# Patient Record
Sex: MALE | Race: WHITE | NOT HISPANIC OR LATINO | Employment: OTHER | URBAN - METROPOLITAN AREA
[De-identification: names, ages, dates, MRNs, and addresses within clinical notes are randomized per-mention and may not be internally consistent; named-entity substitution may affect disease eponyms.]

---

## 2019-10-17 ENCOUNTER — APPOINTMENT (OUTPATIENT)
Dept: RADIOLOGY | Facility: MEDICAL CENTER | Age: 69
DRG: 064 | End: 2019-10-17
Attending: EMERGENCY MEDICINE
Payer: MEDICARE

## 2019-10-17 ENCOUNTER — HOSPITAL ENCOUNTER (INPATIENT)
Facility: MEDICAL CENTER | Age: 69
LOS: 5 days | DRG: 064 | End: 2019-10-25
Attending: EMERGENCY MEDICINE | Admitting: FAMILY MEDICINE
Payer: MEDICARE

## 2019-10-17 DIAGNOSIS — R11.2 NON-INTRACTABLE VOMITING WITH NAUSEA, UNSPECIFIED VOMITING TYPE: ICD-10-CM

## 2019-10-17 DIAGNOSIS — I71.20 THORACIC AORTIC ANEURYSM WITHOUT RUPTURE (HCC): ICD-10-CM

## 2019-10-17 DIAGNOSIS — G44.53 THUNDERCLAP HEADACHE: ICD-10-CM

## 2019-10-17 PROBLEM — I60.9 SUBARACHNOID HEMORRHAGE (HCC): Status: ACTIVE | Noted: 2019-10-17

## 2019-10-17 PROBLEM — I60.9 SUBARACHNOID BLEED (HCC): Status: ACTIVE | Noted: 2019-10-17

## 2019-10-17 PROBLEM — I10 ESSENTIAL HYPERTENSION: Status: ACTIVE | Noted: 2019-10-17

## 2019-10-17 PROBLEM — I60.9 SUBARACHNOID HEMORRHAGE (HCC): Status: RESOLVED | Noted: 2019-10-17 | Resolved: 2019-10-17

## 2019-10-17 PROBLEM — H40.9 GLAUCOMA: Status: ACTIVE | Noted: 2019-10-17

## 2019-10-17 PROCEDURE — 96375 TX/PRO/DX INJ NEW DRUG ADDON: CPT

## 2019-10-17 PROCEDURE — 80048 BASIC METABOLIC PNL TOTAL CA: CPT

## 2019-10-17 PROCEDURE — 700117 HCHG RX CONTRAST REV CODE 255: Performed by: EMERGENCY MEDICINE

## 2019-10-17 PROCEDURE — 700111 HCHG RX REV CODE 636 W/ 250 OVERRIDE (IP)

## 2019-10-17 PROCEDURE — 62270 DX LMBR SPI PNXR: CPT

## 2019-10-17 PROCEDURE — 70496 CT ANGIOGRAPHY HEAD: CPT

## 2019-10-17 PROCEDURE — 009U3ZX DRAINAGE OF SPINAL CANAL, PERCUTANEOUS APPROACH, DIAGNOSTIC: ICD-10-PCS | Performed by: EMERGENCY MEDICINE

## 2019-10-17 PROCEDURE — 96374 THER/PROPH/DIAG INJ IV PUSH: CPT

## 2019-10-17 PROCEDURE — 99220 PR INITIAL OBSERVATION CARE,LEVL III: CPT | Performed by: HOSPITALIST

## 2019-10-17 PROCEDURE — 304561 HCHG STAT O2

## 2019-10-17 PROCEDURE — G0378 HOSPITAL OBSERVATION PER HR: HCPCS

## 2019-10-17 PROCEDURE — 70498 CT ANGIOGRAPHY NECK: CPT

## 2019-10-17 PROCEDURE — 85025 COMPLETE CBC W/AUTO DIFF WBC: CPT

## 2019-10-17 PROCEDURE — 99285 EMERGENCY DEPT VISIT HI MDM: CPT

## 2019-10-17 RX ORDER — ACETAMINOPHEN 325 MG/1
650 TABLET ORAL EVERY 6 HOURS PRN
Status: DISCONTINUED | OUTPATIENT
Start: 2019-10-17 | End: 2019-10-20

## 2019-10-17 RX ORDER — ONDANSETRON 4 MG/1
4 TABLET, ORALLY DISINTEGRATING ORAL EVERY 4 HOURS PRN
Status: DISCONTINUED | OUTPATIENT
Start: 2019-10-17 | End: 2019-10-25 | Stop reason: HOSPADM

## 2019-10-17 RX ORDER — AMLODIPINE BESYLATE 10 MG/1
10 TABLET ORAL DAILY
COMMUNITY

## 2019-10-17 RX ORDER — TIMOLOL MALEATE 5 MG/ML
1 SOLUTION/ DROPS OPHTHALMIC EVERY MORNING
COMMUNITY

## 2019-10-17 RX ORDER — LATANOPROST 50 UG/ML
2 SOLUTION/ DROPS OPHTHALMIC NIGHTLY
Status: DISCONTINUED | OUTPATIENT
Start: 2019-10-18 | End: 2019-10-25 | Stop reason: HOSPADM

## 2019-10-17 RX ORDER — AMLODIPINE BESYLATE 10 MG/1
10 TABLET ORAL DAILY
Status: DISCONTINUED | OUTPATIENT
Start: 2019-10-18 | End: 2019-10-20

## 2019-10-17 RX ORDER — OXYCODONE HYDROCHLORIDE 5 MG/1
2.5 TABLET ORAL
Status: DISCONTINUED | OUTPATIENT
Start: 2019-10-17 | End: 2019-10-20

## 2019-10-17 RX ORDER — MORPHINE SULFATE 4 MG/ML
2 INJECTION, SOLUTION INTRAMUSCULAR; INTRAVENOUS
Status: DISCONTINUED | OUTPATIENT
Start: 2019-10-17 | End: 2019-10-20

## 2019-10-17 RX ORDER — ONDANSETRON 2 MG/ML
4 INJECTION INTRAMUSCULAR; INTRAVENOUS ONCE
Status: COMPLETED | OUTPATIENT
Start: 2019-10-17 | End: 2019-10-17

## 2019-10-17 RX ORDER — LATANOPROST 50 UG/ML
2 SOLUTION/ DROPS OPHTHALMIC NIGHTLY
COMMUNITY

## 2019-10-17 RX ORDER — ONDANSETRON 2 MG/ML
4 INJECTION INTRAMUSCULAR; INTRAVENOUS EVERY 4 HOURS PRN
Status: DISCONTINUED | OUTPATIENT
Start: 2019-10-17 | End: 2019-10-25 | Stop reason: HOSPADM

## 2019-10-17 RX ORDER — AMOXICILLIN 250 MG
2 CAPSULE ORAL 2 TIMES DAILY
Status: DISCONTINUED | OUTPATIENT
Start: 2019-10-18 | End: 2019-10-25 | Stop reason: HOSPADM

## 2019-10-17 RX ORDER — SODIUM CHLORIDE 9 MG/ML
INJECTION, SOLUTION INTRAVENOUS CONTINUOUS
Status: DISCONTINUED | OUTPATIENT
Start: 2019-10-18 | End: 2019-10-20

## 2019-10-17 RX ORDER — BISACODYL 10 MG
10 SUPPOSITORY, RECTAL RECTAL
Status: DISCONTINUED | OUTPATIENT
Start: 2019-10-17 | End: 2019-10-25 | Stop reason: HOSPADM

## 2019-10-17 RX ORDER — OXYCODONE HYDROCHLORIDE 5 MG/1
5 TABLET ORAL
Status: DISCONTINUED | OUTPATIENT
Start: 2019-10-17 | End: 2019-10-20

## 2019-10-17 RX ORDER — TIMOLOL MALEATE 5 MG/ML
1 SOLUTION/ DROPS OPHTHALMIC EVERY MORNING
Status: DISCONTINUED | OUTPATIENT
Start: 2019-10-18 | End: 2019-10-25 | Stop reason: HOSPADM

## 2019-10-17 RX ORDER — POLYETHYLENE GLYCOL 3350 17 G/17G
1 POWDER, FOR SOLUTION ORAL
Status: DISCONTINUED | OUTPATIENT
Start: 2019-10-17 | End: 2019-10-25 | Stop reason: HOSPADM

## 2019-10-17 RX ADMIN — ONDANSETRON 4 MG: 2 INJECTION INTRAMUSCULAR; INTRAVENOUS at 20:58

## 2019-10-17 RX ADMIN — FENTANYL CITRATE 100 MCG: 50 INJECTION, SOLUTION INTRAMUSCULAR; INTRAVENOUS at 20:58

## 2019-10-17 RX ADMIN — IOHEXOL 80 ML: 350 INJECTION, SOLUTION INTRAVENOUS at 20:56

## 2019-10-17 SDOH — HEALTH STABILITY: MENTAL HEALTH: HOW OFTEN DO YOU HAVE A DRINK CONTAINING ALCOHOL?: 2-3 TIMES A WEEK

## 2019-10-17 ASSESSMENT — ENCOUNTER SYMPTOMS
CONSTITUTIONAL NEGATIVE: 1
CARDIOVASCULAR NEGATIVE: 1
BLURRED VISION: 1
MUSCULOSKELETAL NEGATIVE: 1
RESPIRATORY NEGATIVE: 1
GASTROINTESTINAL NEGATIVE: 1
HEADACHES: 1
PSYCHIATRIC NEGATIVE: 1

## 2019-10-18 ENCOUNTER — APPOINTMENT (OUTPATIENT)
Dept: RADIOLOGY | Facility: MEDICAL CENTER | Age: 69
DRG: 064 | End: 2019-10-18
Attending: HOSPITALIST
Payer: MEDICARE

## 2019-10-18 ENCOUNTER — APPOINTMENT (OUTPATIENT)
Dept: RADIOLOGY | Facility: MEDICAL CENTER | Age: 69
DRG: 064 | End: 2019-10-18
Attending: FAMILY MEDICINE
Payer: MEDICARE

## 2019-10-18 PROBLEM — R51.9 HEADACHE: Status: ACTIVE | Noted: 2019-10-18

## 2019-10-18 PROBLEM — M54.2 NECK PAIN: Status: ACTIVE | Noted: 2019-10-18

## 2019-10-18 LAB
ANION GAP SERPL CALC-SCNC: 13 MMOL/L (ref 0–11.9)
ANION GAP SERPL CALC-SCNC: 17 MMOL/L (ref 0–11.9)
APPEARANCE UR: CLEAR
BACTERIA #/AREA URNS HPF: NEGATIVE /HPF
BASOPHILS # BLD AUTO: 0.1 % (ref 0–1.8)
BASOPHILS # BLD AUTO: 0.3 % (ref 0–1.8)
BASOPHILS # BLD: 0.01 K/UL (ref 0–0.12)
BASOPHILS # BLD: 0.03 K/UL (ref 0–0.12)
BILIRUB UR QL STRIP.AUTO: NEGATIVE
BUN SERPL-MCNC: 18 MG/DL (ref 8–22)
BUN SERPL-MCNC: 21 MG/DL (ref 8–22)
BURR CELLS/RBC NFR CSF MANUAL: 0 %
CALCIUM SERPL-MCNC: 9.1 MG/DL (ref 8.5–10.5)
CALCIUM SERPL-MCNC: 9.3 MG/DL (ref 8.5–10.5)
CHLORIDE SERPL-SCNC: 104 MMOL/L (ref 96–112)
CHLORIDE SERPL-SCNC: 105 MMOL/L (ref 96–112)
CLARITY CSF: CLEAR
CO2 SERPL-SCNC: 21 MMOL/L (ref 20–33)
CO2 SERPL-SCNC: 26 MMOL/L (ref 20–33)
COLOR CSF: COLORLESS
COLOR SPUN CSF: COLORLESS
COLOR UR: YELLOW
CREAT SERPL-MCNC: 0.61 MG/DL (ref 0.5–1.4)
CREAT SERPL-MCNC: 0.67 MG/DL (ref 0.5–1.4)
CSF COMMENTS 1658: NORMAL
EOSINOPHIL # BLD AUTO: 0 K/UL (ref 0–0.51)
EOSINOPHIL # BLD AUTO: 0 K/UL (ref 0–0.51)
EOSINOPHIL NFR BLD: 0 % (ref 0–6.9)
EOSINOPHIL NFR BLD: 0 % (ref 0–6.9)
EPI CELLS #/AREA URNS HPF: NEGATIVE /HPF
ERYTHROCYTE [DISTWIDTH] IN BLOOD BY AUTOMATED COUNT: 42.7 FL (ref 35.9–50)
ERYTHROCYTE [DISTWIDTH] IN BLOOD BY AUTOMATED COUNT: 42.7 FL (ref 35.9–50)
ERYTHROCYTE [SEDIMENTATION RATE] IN BLOOD BY WESTERGREN METHOD: 2 MM/HOUR (ref 0–20)
GLUCOSE CSF-MCNC: 79 MG/DL (ref 40–80)
GLUCOSE SERPL-MCNC: 111 MG/DL (ref 65–99)
GLUCOSE SERPL-MCNC: 120 MG/DL (ref 65–99)
GLUCOSE UR STRIP.AUTO-MCNC: NEGATIVE MG/DL
GRAM STN SPEC: NORMAL
HCT VFR BLD AUTO: 49.7 % (ref 42–52)
HCT VFR BLD AUTO: 51.5 % (ref 42–52)
HGB BLD-MCNC: 17.4 G/DL (ref 14–18)
HGB BLD-MCNC: 17.9 G/DL (ref 14–18)
HYALINE CASTS #/AREA URNS LPF: ABNORMAL /LPF
IMM GRANULOCYTES # BLD AUTO: 0.01 K/UL (ref 0–0.11)
IMM GRANULOCYTES # BLD AUTO: 0.04 K/UL (ref 0–0.11)
IMM GRANULOCYTES NFR BLD AUTO: 0.1 % (ref 0–0.9)
IMM GRANULOCYTES NFR BLD AUTO: 0.4 % (ref 0–0.9)
INR PPP: 1.09 (ref 0.87–1.13)
KETONES UR STRIP.AUTO-MCNC: 15 MG/DL
LEUKOCYTE ESTERASE UR QL STRIP.AUTO: NEGATIVE
LYMPHOCYTES # BLD AUTO: 0.64 K/UL (ref 1–4.8)
LYMPHOCYTES # BLD AUTO: 1.03 K/UL (ref 1–4.8)
LYMPHOCYTES NFR BLD: 9.4 % (ref 22–41)
LYMPHOCYTES NFR BLD: 9.8 % (ref 22–41)
LYMPHOCYTES NFR CSF: 66 %
MCH RBC QN AUTO: 31.6 PG (ref 27–33)
MCH RBC QN AUTO: 32 PG (ref 27–33)
MCHC RBC AUTO-ENTMCNC: 34.8 G/DL (ref 33.7–35.3)
MCHC RBC AUTO-ENTMCNC: 35 G/DL (ref 33.7–35.3)
MCV RBC AUTO: 91 FL (ref 81.4–97.8)
MCV RBC AUTO: 91.5 FL (ref 81.4–97.8)
MICRO URNS: ABNORMAL
MONOCYTES # BLD AUTO: 0.35 K/UL (ref 0–0.85)
MONOCYTES # BLD AUTO: 0.35 K/UL (ref 0–0.85)
MONOCYTES NFR BLD AUTO: 3.3 % (ref 0–13.4)
MONOCYTES NFR BLD AUTO: 5.1 % (ref 0–13.4)
MONONUC CELLS NFR CSF: 28 %
NEUTROPHILS # BLD AUTO: 5.83 K/UL (ref 1.82–7.42)
NEUTROPHILS # BLD AUTO: 9.07 K/UL (ref 1.82–7.42)
NEUTROPHILS NFR BLD: 85.3 % (ref 44–72)
NEUTROPHILS NFR BLD: 86.2 % (ref 44–72)
NEUTROPHILS NFR CSF: 6 %
NITRITE UR QL STRIP.AUTO: NEGATIVE
NRBC # BLD AUTO: 0 K/UL
NRBC # BLD AUTO: 0 K/UL
NRBC BLD-RTO: 0 /100 WBC
NRBC BLD-RTO: 0 /100 WBC
PH UR STRIP.AUTO: 6.5 [PH] (ref 5–8)
PLATELET # BLD AUTO: 220 K/UL (ref 164–446)
PLATELET # BLD AUTO: 222 K/UL (ref 164–446)
PMV BLD AUTO: 10.8 FL (ref 9–12.9)
PMV BLD AUTO: 10.9 FL (ref 9–12.9)
POTASSIUM SERPL-SCNC: 4.3 MMOL/L (ref 3.6–5.5)
POTASSIUM SERPL-SCNC: 4.7 MMOL/L (ref 3.6–5.5)
PROT CSF-MCNC: 49 MG/DL (ref 15–45)
PROT UR QL STRIP: NEGATIVE MG/DL
PROTHROMBIN TIME: 14.3 SEC (ref 12–14.6)
RBC # BLD AUTO: 5.43 M/UL (ref 4.7–6.1)
RBC # BLD AUTO: 5.66 M/UL (ref 4.7–6.1)
RBC # CSF: 24 CELLS/UL
RBC # URNS HPF: ABNORMAL /HPF
RBC UR QL AUTO: ABNORMAL
SIGNIFICANT IND 70042: NORMAL
SITE SITE: NORMAL
SODIUM SERPL-SCNC: 143 MMOL/L (ref 135–145)
SODIUM SERPL-SCNC: 143 MMOL/L (ref 135–145)
SOURCE SOURCE: NORMAL
SP GR UR STRIP.AUTO: 1.01
SPECIMEN VOL CSF: 2.5 ML
TSH SERPL DL<=0.005 MIU/L-ACNC: 0.39 UIU/ML (ref 0.38–5.33)
TUBE # CSF: 3
TUBE # CSF: 3
UROBILINOGEN UR STRIP.AUTO-MCNC: 1 MG/DL
WBC # BLD AUTO: 10.5 K/UL (ref 4.8–10.8)
WBC # BLD AUTO: 6.8 K/UL (ref 4.8–10.8)
WBC # CSF: 1 CELLS/UL (ref 0–10)
WBC #/AREA URNS HPF: ABNORMAL /HPF

## 2019-10-18 PROCEDURE — 87070 CULTURE OTHR SPECIMN AEROBIC: CPT

## 2019-10-18 PROCEDURE — 82945 GLUCOSE OTHER FLUID: CPT

## 2019-10-18 PROCEDURE — 700102 HCHG RX REV CODE 250 W/ 637 OVERRIDE(OP): Performed by: HOSPITALIST

## 2019-10-18 PROCEDURE — 700101 HCHG RX REV CODE 250: Performed by: FAMILY MEDICINE

## 2019-10-18 PROCEDURE — 96376 TX/PRO/DX INJ SAME DRUG ADON: CPT

## 2019-10-18 PROCEDURE — 700111 HCHG RX REV CODE 636 W/ 250 OVERRIDE (IP): Performed by: HOSPITALIST

## 2019-10-18 PROCEDURE — 72141 MRI NECK SPINE W/O DYE: CPT

## 2019-10-18 PROCEDURE — 85652 RBC SED RATE AUTOMATED: CPT

## 2019-10-18 PROCEDURE — 80048 BASIC METABOLIC PNL TOTAL CA: CPT

## 2019-10-18 PROCEDURE — 85025 COMPLETE CBC W/AUTO DIFF WBC: CPT

## 2019-10-18 PROCEDURE — 87040 BLOOD CULTURE FOR BACTERIA: CPT | Mod: 91

## 2019-10-18 PROCEDURE — 99226 PR SUBSEQUENT OBSERVATION CARE,LEVEL III: CPT | Performed by: FAMILY MEDICINE

## 2019-10-18 PROCEDURE — 62270 DX LMBR SPI PNXR: CPT

## 2019-10-18 PROCEDURE — 36415 COLL VENOUS BLD VENIPUNCTURE: CPT

## 2019-10-18 PROCEDURE — G0378 HOSPITAL OBSERVATION PER HR: HCPCS

## 2019-10-18 PROCEDURE — 87205 SMEAR GRAM STAIN: CPT

## 2019-10-18 PROCEDURE — 85610 PROTHROMBIN TIME: CPT

## 2019-10-18 PROCEDURE — 700105 HCHG RX REV CODE 258: Performed by: HOSPITALIST

## 2019-10-18 PROCEDURE — 84443 ASSAY THYROID STIM HORMONE: CPT

## 2019-10-18 PROCEDURE — 84157 ASSAY OF PROTEIN OTHER: CPT

## 2019-10-18 PROCEDURE — A9270 NON-COVERED ITEM OR SERVICE: HCPCS | Performed by: HOSPITALIST

## 2019-10-18 PROCEDURE — 81001 URINALYSIS AUTO W/SCOPE: CPT

## 2019-10-18 PROCEDURE — 700101 HCHG RX REV CODE 250: Performed by: HOSPITALIST

## 2019-10-18 PROCEDURE — 71046 X-RAY EXAM CHEST 2 VIEWS: CPT

## 2019-10-18 PROCEDURE — 89051 BODY FLUID CELL COUNT: CPT

## 2019-10-18 RX ORDER — LIDOCAINE 50 MG/G
2 PATCH TOPICAL EVERY 24 HOURS
Status: DISCONTINUED | OUTPATIENT
Start: 2019-10-18 | End: 2019-10-23

## 2019-10-18 RX ADMIN — SODIUM CHLORIDE: 9 INJECTION, SOLUTION INTRAVENOUS at 14:05

## 2019-10-18 RX ADMIN — TIMOLOL MALEATE 1 DROP: 5 SOLUTION OPHTHALMIC at 05:55

## 2019-10-18 RX ADMIN — LIDOCAINE 2 PATCH: 50 PATCH TOPICAL at 17:52

## 2019-10-18 RX ADMIN — ONDANSETRON 4 MG: 2 INJECTION INTRAMUSCULAR; INTRAVENOUS at 14:45

## 2019-10-18 RX ADMIN — ACETAMINOPHEN 650 MG: 325 TABLET, FILM COATED ORAL at 04:26

## 2019-10-18 RX ADMIN — AMLODIPINE BESYLATE 10 MG: 10 TABLET ORAL at 05:54

## 2019-10-18 RX ADMIN — SODIUM CHLORIDE: 9 INJECTION, SOLUTION INTRAVENOUS at 02:10

## 2019-10-18 ASSESSMENT — PATIENT HEALTH QUESTIONNAIRE - PHQ9
SUM OF ALL RESPONSES TO PHQ9 QUESTIONS 1 AND 2: 0
1. LITTLE INTEREST OR PLEASURE IN DOING THINGS: NOT AT ALL
1. LITTLE INTEREST OR PLEASURE IN DOING THINGS: NOT AT ALL
2. FEELING DOWN, DEPRESSED, IRRITABLE, OR HOPELESS: NOT AT ALL
1. LITTLE INTEREST OR PLEASURE IN DOING THINGS: NOT AT ALL
SUM OF ALL RESPONSES TO PHQ9 QUESTIONS 1 AND 2: 0
2. FEELING DOWN, DEPRESSED, IRRITABLE, OR HOPELESS: NOT AT ALL
2. FEELING DOWN, DEPRESSED, IRRITABLE, OR HOPELESS: NOT AT ALL
SUM OF ALL RESPONSES TO PHQ9 QUESTIONS 1 AND 2: 0

## 2019-10-18 ASSESSMENT — ENCOUNTER SYMPTOMS
DIARRHEA: 0
CHILLS: 0
SORE THROAT: 0
NAUSEA: 1
SPUTUM PRODUCTION: 0
BLURRED VISION: 0
VOMITING: 0
SHORTNESS OF BREATH: 0
PHOTOPHOBIA: 0
NECK PAIN: 1
COUGH: 0
ABDOMINAL PAIN: 0
SINUS PAIN: 0
BACK PAIN: 0
FEVER: 0
FALLS: 0
BLOOD IN STOOL: 0
PALPITATIONS: 0
HEARTBURN: 0
CONSTIPATION: 0
DIZZINESS: 0
DOUBLE VISION: 0
WHEEZING: 0
WEAKNESS: 1
DIAPHORESIS: 0
MYALGIAS: 0
HEADACHES: 1

## 2019-10-18 ASSESSMENT — LIFESTYLE VARIABLES
HAVE PEOPLE ANNOYED YOU BY CRITICIZING YOUR DRINKING: NO
TOTAL SCORE: 0
HAVE YOU EVER FELT YOU SHOULD CUT DOWN ON YOUR DRINKING: NO
TOTAL SCORE: 0
CONSUMPTION TOTAL: NEGATIVE
AVERAGE NUMBER OF DAYS PER WEEK YOU HAVE A DRINK CONTAINING ALCOHOL: 7
EVER HAD A DRINK FIRST THING IN THE MORNING TO STEADY YOUR NERVES TO GET RID OF A HANGOVER: NO
ON A TYPICAL DAY WHEN YOU DRINK ALCOHOL HOW MANY DRINKS DO YOU HAVE: 2
HOW MANY TIMES IN THE PAST YEAR HAVE YOU HAD 5 OR MORE DRINKS IN A DAY: 0
EVER_SMOKED: NEVER
ALCOHOL_USE: YES
EVER FELT BAD OR GUILTY ABOUT YOUR DRINKING: NO
TOTAL SCORE: 0

## 2019-10-18 NOTE — ED NOTES
Medication reconciliation updated and complete per pt at bedside with rx bottles  Reviewed rx bottles and returned to pt   Allergies have been verified   No oral ABX within the last 14 days  Pt home pharmacy:[facility] in [place]

## 2019-10-18 NOTE — PROGRESS NOTES
Admit from ED via gurney,fatigue alert, pt reports dizziness, refusing to ambulate at this time,transferred to bed safely,hooked to the monitor,bedside swallow completed,call button within reach,will continue to monitor.

## 2019-10-18 NOTE — ASSESSMENT & PLAN NOTE
Concern for the same with headache and visual change, but with normal CTA of head and neck.  LP attempted in ED with serous pink fluid but no other spinal fluid attempted X3 bottles.  Plan for IR LP in AM.  Neurochecks.  Monitor.

## 2019-10-18 NOTE — ED TRIAGE NOTES
"Chief Complaint   Patient presents with   • Headache     Sudden onset of 10/10 posterior head pain. No trauma. Dizziness, nausea. Transfered for CT.     /86   Pulse 79   Temp 36.6 °C (97.8 °F) (Temporal)   Resp 16   Ht 1.803 m (5' 11\")   Wt 77.1 kg (170 lb)   SpO2 94%   BMI 23.71 kg/m²     PT to ER. Hospital transfer for CT.    PT received 100 mcg fentanyl. 8 mg zofran pta. Pain is controlled, pt reports nausea.    PT on 2L NC, does not wear oxygen at home.  "
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
## 2019-10-18 NOTE — ASSESSMENT & PLAN NOTE
Keep systolic blood pressure less than 140 as per neurology  PRN IV Enalapril, hydralazine, and labetalol  Continue norvasc 10 mg daily home medication  He required an IV nicardipine drip thus ICU  Increased lisinopril from 10 mg today to 20 daily
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
## 2019-10-19 ENCOUNTER — APPOINTMENT (OUTPATIENT)
Dept: RADIOLOGY | Facility: MEDICAL CENTER | Age: 69
DRG: 064 | End: 2019-10-19
Attending: FAMILY MEDICINE
Payer: MEDICARE

## 2019-10-19 PROBLEM — R42 DIZZINESS: Status: ACTIVE | Noted: 2019-10-19

## 2019-10-19 LAB
ALBUMIN SERPL BCP-MCNC: 3.7 G/DL (ref 3.2–4.9)
ALBUMIN/GLOB SERPL: 1.9 G/DL
ALP SERPL-CCNC: 43 U/L (ref 30–99)
ALT SERPL-CCNC: 11 U/L (ref 2–50)
ANION GAP SERPL CALC-SCNC: 10 MMOL/L (ref 0–11.9)
AST SERPL-CCNC: 16 U/L (ref 12–45)
BASOPHILS # BLD AUTO: 0.3 % (ref 0–1.8)
BASOPHILS # BLD: 0.03 K/UL (ref 0–0.12)
BILIRUB SERPL-MCNC: 1.2 MG/DL (ref 0.1–1.5)
BUN SERPL-MCNC: 12 MG/DL (ref 8–22)
CALCIUM SERPL-MCNC: 8.5 MG/DL (ref 8.5–10.5)
CHLORIDE SERPL-SCNC: 106 MMOL/L (ref 96–112)
CO2 SERPL-SCNC: 23 MMOL/L (ref 20–33)
CREAT SERPL-MCNC: 0.58 MG/DL (ref 0.5–1.4)
CRP SERPL HS-MCNC: 0.18 MG/DL (ref 0–0.75)
EOSINOPHIL # BLD AUTO: 0.02 K/UL (ref 0–0.51)
EOSINOPHIL NFR BLD: 0.2 % (ref 0–6.9)
ERYTHROCYTE [DISTWIDTH] IN BLOOD BY AUTOMATED COUNT: 42 FL (ref 35.9–50)
ERYTHROCYTE [SEDIMENTATION RATE] IN BLOOD BY WESTERGREN METHOD: 2 MM/HOUR (ref 0–20)
FLUAV RNA SPEC QL NAA+PROBE: NEGATIVE
FLUBV RNA SPEC QL NAA+PROBE: NEGATIVE
GLOBULIN SER CALC-MCNC: 1.9 G/DL (ref 1.9–3.5)
GLUCOSE SERPL-MCNC: 96 MG/DL (ref 65–99)
HCT VFR BLD AUTO: 49.2 % (ref 42–52)
HGB BLD-MCNC: 16.5 G/DL (ref 14–18)
IMM GRANULOCYTES # BLD AUTO: 0.02 K/UL (ref 0–0.11)
IMM GRANULOCYTES NFR BLD AUTO: 0.2 % (ref 0–0.9)
LYMPHOCYTES # BLD AUTO: 1.99 K/UL (ref 1–4.8)
LYMPHOCYTES NFR BLD: 21.8 % (ref 22–41)
MCH RBC QN AUTO: 31 PG (ref 27–33)
MCHC RBC AUTO-ENTMCNC: 33.5 G/DL (ref 33.7–35.3)
MCV RBC AUTO: 92.3 FL (ref 81.4–97.8)
MONOCYTES # BLD AUTO: 1.03 K/UL (ref 0–0.85)
MONOCYTES NFR BLD AUTO: 11.3 % (ref 0–13.4)
NEUTROPHILS # BLD AUTO: 6.04 K/UL (ref 1.82–7.42)
NEUTROPHILS NFR BLD: 66.2 % (ref 44–72)
NRBC # BLD AUTO: 0 K/UL
NRBC BLD-RTO: 0 /100 WBC
PLATELET # BLD AUTO: 203 K/UL (ref 164–446)
PMV BLD AUTO: 10.2 FL (ref 9–12.9)
POTASSIUM SERPL-SCNC: 3.7 MMOL/L (ref 3.6–5.5)
PROT SERPL-MCNC: 5.6 G/DL (ref 6–8.2)
RBC # BLD AUTO: 5.33 M/UL (ref 4.7–6.1)
SODIUM SERPL-SCNC: 139 MMOL/L (ref 135–145)
WBC # BLD AUTO: 9.1 K/UL (ref 4.8–10.8)

## 2019-10-19 PROCEDURE — 700105 HCHG RX REV CODE 258: Performed by: HOSPITALIST

## 2019-10-19 PROCEDURE — 86140 C-REACTIVE PROTEIN: CPT

## 2019-10-19 PROCEDURE — 85652 RBC SED RATE AUTOMATED: CPT

## 2019-10-19 PROCEDURE — 85025 COMPLETE CBC W/AUTO DIFF WBC: CPT

## 2019-10-19 PROCEDURE — 87502 INFLUENZA DNA AMP PROBE: CPT

## 2019-10-19 PROCEDURE — A9270 NON-COVERED ITEM OR SERVICE: HCPCS | Performed by: HOSPITALIST

## 2019-10-19 PROCEDURE — A9576 INJ PROHANCE MULTIPACK: HCPCS | Performed by: FAMILY MEDICINE

## 2019-10-19 PROCEDURE — 70553 MRI BRAIN STEM W/O & W/DYE: CPT

## 2019-10-19 PROCEDURE — 80053 COMPREHEN METABOLIC PANEL: CPT

## 2019-10-19 PROCEDURE — 99226 PR SUBSEQUENT OBSERVATION CARE,LEVEL III: CPT | Performed by: FAMILY MEDICINE

## 2019-10-19 PROCEDURE — 700102 HCHG RX REV CODE 250 W/ 637 OVERRIDE(OP): Performed by: HOSPITALIST

## 2019-10-19 PROCEDURE — 36415 COLL VENOUS BLD VENIPUNCTURE: CPT

## 2019-10-19 PROCEDURE — 700117 HCHG RX CONTRAST REV CODE 255: Performed by: FAMILY MEDICINE

## 2019-10-19 PROCEDURE — G0378 HOSPITAL OBSERVATION PER HR: HCPCS

## 2019-10-19 RX ADMIN — ACETAMINOPHEN 650 MG: 325 TABLET, FILM COATED ORAL at 19:41

## 2019-10-19 RX ADMIN — SODIUM CHLORIDE: 9 INJECTION, SOLUTION INTRAVENOUS at 03:30

## 2019-10-19 RX ADMIN — GADOTERIDOL 15 ML: 279.3 INJECTION, SOLUTION INTRAVENOUS at 15:10

## 2019-10-19 RX ADMIN — OXYCODONE HYDROCHLORIDE 5 MG: 5 TABLET ORAL at 10:04

## 2019-10-19 RX ADMIN — SODIUM CHLORIDE: 9 INJECTION, SOLUTION INTRAVENOUS at 18:00

## 2019-10-19 RX ADMIN — LATANOPROST 2 DROP: 50 SOLUTION OPHTHALMIC at 19:32

## 2019-10-19 RX ADMIN — TIMOLOL MALEATE 1 DROP: 5 SOLUTION OPHTHALMIC at 06:20

## 2019-10-19 RX ADMIN — AMLODIPINE BESYLATE 10 MG: 10 TABLET ORAL at 06:18

## 2019-10-19 ASSESSMENT — ENCOUNTER SYMPTOMS
DIZZINESS: 0
HEARTBURN: 0
BLURRED VISION: 0
FEVER: 0
MYALGIAS: 0
PHOTOPHOBIA: 1
ABDOMINAL PAIN: 0
WHEEZING: 0
HEADACHES: 1
SINUS PAIN: 0
WEAKNESS: 1
SHORTNESS OF BREATH: 0
SORE THROAT: 0
FALLS: 0
NAUSEA: 1
SPUTUM PRODUCTION: 0
BACK PAIN: 0
BLOOD IN STOOL: 0
DIAPHORESIS: 0
VOMITING: 0
PALPITATIONS: 0
DOUBLE VISION: 0
COUGH: 0
CONSTIPATION: 0
DIARRHEA: 0
NECK PAIN: 1
CHILLS: 0

## 2019-10-19 ASSESSMENT — PATIENT HEALTH QUESTIONNAIRE - PHQ9
1. LITTLE INTEREST OR PLEASURE IN DOING THINGS: NOT AT ALL
2. FEELING DOWN, DEPRESSED, IRRITABLE, OR HOPELESS: NOT AT ALL
SUM OF ALL RESPONSES TO PHQ9 QUESTIONS 1 AND 2: 0

## 2019-10-19 ASSESSMENT — PAIN SCALES - WONG BAKER: WONGBAKER_NUMERICALRESPONSE: HURTS JUST A LITTLE BIT

## 2019-10-19 ASSESSMENT — LIFESTYLE VARIABLES: REASON UNABLE TO ASSESS: 0

## 2019-10-19 NOTE — ASSESSMENT & PLAN NOTE
Secondary to cerebral infarct  He has a re-occurrence of symptoms thus stat CT head ordered for possible hemorrhagic transformation
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
## 2019-10-19 NOTE — CONSULTS
ID consult requested by DAHLIA López.  Attempted to see patient however patient down an MRI  Chart reviewed  70 yo man with glaucoma and HTN admitted for headache and neck pain.  CT head and neck unremarkable  S/p LP on 10/18/19. CSF analysis not c/w infection  No need for abx at this time  Full consult to follow  Preliminary recs d/w DAHLIA Grover and CLAUS/Dr. Palma

## 2019-10-19 NOTE — ASSESSMENT & PLAN NOTE
Secondary to CVA, symptomatic treatment  Cerebral spinal fluid was negative for meningitis/encephalitis

## 2019-10-19 NOTE — PROGRESS NOTES
Patient back to unit/room from Xray. Tolerated well. No events. Assisted back to bed safely. Safety and comfort ensured. Promoted rest and relaxation.

## 2019-10-19 NOTE — PROGRESS NOTES
Received  bedside report from Lisa HINSON. Assumed pt care. Received pt in bed awake, AOx4, SR on monitor, HR: 70s, IVF infusing well. Reports mild right sided neck pain, lidocaine patch in place, poor appetite and weakness. Plan of care reviewed and understood by patient. Encouraged to verbalize feelings. Safety measures in place. Comfort measures offered. Reinforced use of call light.

## 2019-10-19 NOTE — PROGRESS NOTES
0320 AM labs drawn and sent to lab. Patient sleeping/resting calmly at the moment, reports mild tolerable head and neck pains. Declines any interventions at this time.

## 2019-10-19 NOTE — PROGRESS NOTES
Patient resting/sleeping calmly in bed, easily awakened, IVF infusing well. Pt not in distress. Patient denies any needs at this time. Reinforced use of all light. Promoted rest and sleep.

## 2019-10-19 NOTE — PROGRESS NOTES
Patient to Radiology for  2 View CXR, transported via gurney. Safety and fall precautions in place.

## 2019-10-20 ENCOUNTER — APPOINTMENT (OUTPATIENT)
Dept: RADIOLOGY | Facility: MEDICAL CENTER | Age: 69
DRG: 064 | End: 2019-10-20
Attending: STUDENT IN AN ORGANIZED HEALTH CARE EDUCATION/TRAINING PROGRAM
Payer: MEDICARE

## 2019-10-20 ENCOUNTER — APPOINTMENT (OUTPATIENT)
Dept: CARDIOLOGY | Facility: MEDICAL CENTER | Age: 69
DRG: 064 | End: 2019-10-20
Attending: FAMILY MEDICINE
Payer: MEDICARE

## 2019-10-20 ENCOUNTER — APPOINTMENT (OUTPATIENT)
Dept: RADIOLOGY | Facility: MEDICAL CENTER | Age: 69
DRG: 064 | End: 2019-10-20
Attending: NEUROLOGICAL SURGERY
Payer: MEDICARE

## 2019-10-20 PROBLEM — I63.9 CVA (CEREBRAL VASCULAR ACCIDENT) (HCC): Status: ACTIVE | Noted: 2019-10-20

## 2019-10-20 LAB
ALBUMIN SERPL BCP-MCNC: 3.6 G/DL (ref 3.2–4.9)
ALBUMIN/GLOB SERPL: 1.8 G/DL
ALP SERPL-CCNC: 40 U/L (ref 30–99)
ALT SERPL-CCNC: 9 U/L (ref 2–50)
ANION GAP SERPL CALC-SCNC: 8 MMOL/L (ref 0–11.9)
ANION GAP SERPL CALC-SCNC: 9 MMOL/L (ref 0–11.9)
AST SERPL-CCNC: 13 U/L (ref 12–45)
BASOPHILS # BLD AUTO: 0.3 % (ref 0–1.8)
BASOPHILS # BLD: 0.02 K/UL (ref 0–0.12)
BILIRUB SERPL-MCNC: 1.4 MG/DL (ref 0.1–1.5)
BUN SERPL-MCNC: 12 MG/DL (ref 8–22)
BUN SERPL-MCNC: 13 MG/DL (ref 8–22)
BUN SERPL-MCNC: 14 MG/DL (ref 8–22)
BUN SERPL-MCNC: 17 MG/DL (ref 8–22)
CALCIUM SERPL-MCNC: 8.6 MG/DL (ref 8.5–10.5)
CALCIUM SERPL-MCNC: 8.6 MG/DL (ref 8.5–10.5)
CALCIUM SERPL-MCNC: 8.8 MG/DL (ref 8.5–10.5)
CALCIUM SERPL-MCNC: 9 MG/DL (ref 8.5–10.5)
CHLORIDE SERPL-SCNC: 103 MMOL/L (ref 96–112)
CHLORIDE SERPL-SCNC: 104 MMOL/L (ref 96–112)
CHLORIDE SERPL-SCNC: 105 MMOL/L (ref 96–112)
CHLORIDE SERPL-SCNC: 107 MMOL/L (ref 96–112)
CO2 SERPL-SCNC: 23 MMOL/L (ref 20–33)
CO2 SERPL-SCNC: 24 MMOL/L (ref 20–33)
CO2 SERPL-SCNC: 24 MMOL/L (ref 20–33)
CO2 SERPL-SCNC: 25 MMOL/L (ref 20–33)
CREAT SERPL-MCNC: 0.55 MG/DL (ref 0.5–1.4)
CREAT SERPL-MCNC: 0.56 MG/DL (ref 0.5–1.4)
CREAT SERPL-MCNC: 0.66 MG/DL (ref 0.5–1.4)
CREAT SERPL-MCNC: 0.66 MG/DL (ref 0.5–1.4)
EOSINOPHIL # BLD AUTO: 0.05 K/UL (ref 0–0.51)
EOSINOPHIL NFR BLD: 0.7 % (ref 0–6.9)
ERYTHROCYTE [DISTWIDTH] IN BLOOD BY AUTOMATED COUNT: 39.3 FL (ref 35.9–50)
EST. AVERAGE GLUCOSE BLD GHB EST-MCNC: 103 MG/DL
GLOBULIN SER CALC-MCNC: 2 G/DL (ref 1.9–3.5)
GLUCOSE SERPL-MCNC: 173 MG/DL (ref 65–99)
GLUCOSE SERPL-MCNC: 91 MG/DL (ref 65–99)
GLUCOSE SERPL-MCNC: 95 MG/DL (ref 65–99)
GLUCOSE SERPL-MCNC: 99 MG/DL (ref 65–99)
HBA1C MFR BLD: 5.2 % (ref 0–5.6)
HCT VFR BLD AUTO: 48.3 % (ref 42–52)
HGB BLD-MCNC: 17.1 G/DL (ref 14–18)
IMM GRANULOCYTES # BLD AUTO: 0.01 K/UL (ref 0–0.11)
IMM GRANULOCYTES NFR BLD AUTO: 0.1 % (ref 0–0.9)
LV EJECT FRACT MOD 2C 99903: 71.39
LV EJECT FRACT MOD 4C 99902: 61.51
LV EJECT FRACT MOD BP 99901: 66.36
LYMPHOCYTES # BLD AUTO: 1.69 K/UL (ref 1–4.8)
LYMPHOCYTES NFR BLD: 25 % (ref 22–41)
MCH RBC QN AUTO: 31.9 PG (ref 27–33)
MCHC RBC AUTO-ENTMCNC: 35.4 G/DL (ref 33.7–35.3)
MCV RBC AUTO: 90.1 FL (ref 81.4–97.8)
MONOCYTES # BLD AUTO: 0.85 K/UL (ref 0–0.85)
MONOCYTES NFR BLD AUTO: 12.6 % (ref 0–13.4)
NEUTROPHILS # BLD AUTO: 4.15 K/UL (ref 1.82–7.42)
NEUTROPHILS NFR BLD: 61.3 % (ref 44–72)
NRBC # BLD AUTO: 0 K/UL
NRBC BLD-RTO: 0 /100 WBC
PLATELET # BLD AUTO: 182 K/UL (ref 164–446)
PMV BLD AUTO: 9.8 FL (ref 9–12.9)
POTASSIUM SERPL-SCNC: 3.7 MMOL/L (ref 3.6–5.5)
PROT SERPL-MCNC: 5.6 G/DL (ref 6–8.2)
RBC # BLD AUTO: 5.36 M/UL (ref 4.7–6.1)
SODIUM SERPL-SCNC: 136 MMOL/L (ref 135–145)
SODIUM SERPL-SCNC: 137 MMOL/L (ref 135–145)
SODIUM SERPL-SCNC: 138 MMOL/L (ref 135–145)
SODIUM SERPL-SCNC: 139 MMOL/L (ref 135–145)
WBC # BLD AUTO: 6.8 K/UL (ref 4.8–10.8)

## 2019-10-20 PROCEDURE — 36556 INSERT NON-TUNNEL CV CATH: CPT

## 2019-10-20 PROCEDURE — 36556 INSERT NON-TUNNEL CV CATH: CPT | Mod: RT | Performed by: INTERNAL MEDICINE

## 2019-10-20 PROCEDURE — 85025 COMPLETE CBC W/AUTO DIFF WBC: CPT

## 2019-10-20 PROCEDURE — B548ZZA ULTRASONOGRAPHY OF SUPERIOR VENA CAVA, GUIDANCE: ICD-10-PCS | Performed by: INTERNAL MEDICINE

## 2019-10-20 PROCEDURE — 36415 COLL VENOUS BLD VENIPUNCTURE: CPT

## 2019-10-20 PROCEDURE — 700102 HCHG RX REV CODE 250 W/ 637 OVERRIDE(OP): Performed by: PSYCHIATRY & NEUROLOGY

## 2019-10-20 PROCEDURE — 99223 1ST HOSP IP/OBS HIGH 75: CPT | Performed by: INTERNAL MEDICINE

## 2019-10-20 PROCEDURE — 99291 CRITICAL CARE FIRST HOUR: CPT | Mod: 25 | Performed by: INTERNAL MEDICINE

## 2019-10-20 PROCEDURE — 700111 HCHG RX REV CODE 636 W/ 250 OVERRIDE (IP): Performed by: INTERNAL MEDICINE

## 2019-10-20 PROCEDURE — A9270 NON-COVERED ITEM OR SERVICE: HCPCS | Performed by: FAMILY MEDICINE

## 2019-10-20 PROCEDURE — 306588 SLEEVE,VASO CALF MED: Performed by: FAMILY MEDICINE

## 2019-10-20 PROCEDURE — 70450 CT HEAD/BRAIN W/O DYE: CPT

## 2019-10-20 PROCEDURE — 93306 TTE W/DOPPLER COMPLETE: CPT

## 2019-10-20 PROCEDURE — 92610 EVALUATE SWALLOWING FUNCTION: CPT

## 2019-10-20 PROCEDURE — 02HV33Z INSERTION OF INFUSION DEVICE INTO SUPERIOR VENA CAVA, PERCUTANEOUS APPROACH: ICD-10-PCS | Performed by: INTERNAL MEDICINE

## 2019-10-20 PROCEDURE — A9270 NON-COVERED ITEM OR SERVICE: HCPCS | Performed by: HOSPITALIST

## 2019-10-20 PROCEDURE — C1751 CATH, INF, PER/CENT/MIDLINE: HCPCS | Performed by: INTERNAL MEDICINE

## 2019-10-20 PROCEDURE — A9270 NON-COVERED ITEM OR SERVICE: HCPCS | Performed by: PSYCHIATRY & NEUROLOGY

## 2019-10-20 PROCEDURE — 700102 HCHG RX REV CODE 250 W/ 637 OVERRIDE(OP): Performed by: FAMILY MEDICINE

## 2019-10-20 PROCEDURE — 700105 HCHG RX REV CODE 258: Performed by: HOSPITALIST

## 2019-10-20 PROCEDURE — 770022 HCHG ROOM/CARE - ICU (200)

## 2019-10-20 PROCEDURE — 700102 HCHG RX REV CODE 250 W/ 637 OVERRIDE(OP): Performed by: HOSPITALIST

## 2019-10-20 PROCEDURE — 83036 HEMOGLOBIN GLYCOSYLATED A1C: CPT

## 2019-10-20 PROCEDURE — 99233 SBSQ HOSP IP/OBS HIGH 50: CPT | Performed by: FAMILY MEDICINE

## 2019-10-20 PROCEDURE — 700105 HCHG RX REV CODE 258: Performed by: PSYCHIATRY & NEUROLOGY

## 2019-10-20 PROCEDURE — 700105 HCHG RX REV CODE 258: Performed by: INTERNAL MEDICINE

## 2019-10-20 PROCEDURE — 700111 HCHG RX REV CODE 636 W/ 250 OVERRIDE (IP): Performed by: PSYCHIATRY & NEUROLOGY

## 2019-10-20 PROCEDURE — 93306 TTE W/DOPPLER COMPLETE: CPT | Mod: 26 | Performed by: INTERNAL MEDICINE

## 2019-10-20 PROCEDURE — 80048 BASIC METABOLIC PNL TOTAL CA: CPT

## 2019-10-20 PROCEDURE — C1751 CATH, INF, PER/CENT/MIDLINE: HCPCS

## 2019-10-20 PROCEDURE — 80053 COMPREHEN METABOLIC PANEL: CPT

## 2019-10-20 PROCEDURE — 700111 HCHG RX REV CODE 636 W/ 250 OVERRIDE (IP): Performed by: FAMILY MEDICINE

## 2019-10-20 PROCEDURE — 99221 1ST HOSP IP/OBS SF/LOW 40: CPT | Performed by: PSYCHIATRY & NEUROLOGY

## 2019-10-20 RX ORDER — HYDRALAZINE HYDROCHLORIDE 20 MG/ML
10-20 INJECTION INTRAMUSCULAR; INTRAVENOUS EVERY 6 HOURS PRN
Status: DISCONTINUED | OUTPATIENT
Start: 2019-10-20 | End: 2019-10-25 | Stop reason: HOSPADM

## 2019-10-20 RX ORDER — AMLODIPINE BESYLATE 10 MG/1
10 TABLET ORAL
Status: DISCONTINUED | OUTPATIENT
Start: 2019-10-21 | End: 2019-10-25 | Stop reason: HOSPADM

## 2019-10-20 RX ORDER — ASPIRIN 81 MG/1
81 TABLET, CHEWABLE ORAL DAILY
Status: DISCONTINUED | OUTPATIENT
Start: 2019-10-21 | End: 2019-10-21

## 2019-10-20 RX ORDER — SODIUM CHLORIDE 9 MG/ML
INJECTION, SOLUTION INTRAVENOUS CONTINUOUS
Status: DISCONTINUED | OUTPATIENT
Start: 2019-10-20 | End: 2019-10-20

## 2019-10-20 RX ORDER — BUTALBITAL, ACETAMINOPHEN AND CAFFEINE 50; 325; 40 MG/1; MG/1; MG/1
1 TABLET ORAL EVERY 6 HOURS PRN
Status: DISCONTINUED | OUTPATIENT
Start: 2019-10-20 | End: 2019-10-20

## 2019-10-20 RX ORDER — ASPIRIN 81 MG/1
324 TABLET, CHEWABLE ORAL DAILY
Status: DISCONTINUED | OUTPATIENT
Start: 2019-10-20 | End: 2019-10-20

## 2019-10-20 RX ORDER — LABETALOL HYDROCHLORIDE 5 MG/ML
10 INJECTION, SOLUTION INTRAVENOUS
Status: DISCONTINUED | OUTPATIENT
Start: 2019-10-20 | End: 2019-10-20

## 2019-10-20 RX ORDER — ASPIRIN 300 MG/1
300 SUPPOSITORY RECTAL DAILY
Status: DISCONTINUED | OUTPATIENT
Start: 2019-10-20 | End: 2019-10-20

## 2019-10-20 RX ORDER — ASPIRIN 325 MG
325 TABLET ORAL DAILY
Status: DISCONTINUED | OUTPATIENT
Start: 2019-10-20 | End: 2019-10-20

## 2019-10-20 RX ORDER — LABETALOL HYDROCHLORIDE 5 MG/ML
10 INJECTION, SOLUTION INTRAVENOUS
Status: DISCONTINUED | OUTPATIENT
Start: 2019-10-20 | End: 2019-10-21

## 2019-10-20 RX ORDER — ENALAPRILAT 1.25 MG/ML
1.25 INJECTION INTRAVENOUS EVERY 6 HOURS PRN
Status: DISCONTINUED | OUTPATIENT
Start: 2019-10-20 | End: 2019-10-20

## 2019-10-20 RX ORDER — 3% SODIUM CHLORIDE 3 G/100ML
500 INJECTION, SOLUTION INTRAVENOUS CONTINUOUS
Status: DISCONTINUED | OUTPATIENT
Start: 2019-10-20 | End: 2019-10-23

## 2019-10-20 RX ORDER — ASPIRIN 325 MG
650 TABLET ORAL ONCE
Status: COMPLETED | OUTPATIENT
Start: 2019-10-20 | End: 2019-10-20

## 2019-10-20 RX ORDER — ENALAPRILAT 1.25 MG/ML
1.25 INJECTION INTRAVENOUS EVERY 6 HOURS PRN
Status: DISCONTINUED | OUTPATIENT
Start: 2019-10-20 | End: 2019-10-21

## 2019-10-20 RX ORDER — ATORVASTATIN CALCIUM 40 MG/1
80 TABLET, FILM COATED ORAL
Status: DISCONTINUED | OUTPATIENT
Start: 2019-10-20 | End: 2019-10-25 | Stop reason: HOSPADM

## 2019-10-20 RX ADMIN — ENALAPRILAT 1.25 MG: 1.25 INJECTION INTRAVENOUS at 09:56

## 2019-10-20 RX ADMIN — AMLODIPINE BESYLATE 10 MG: 10 TABLET ORAL at 06:20

## 2019-10-20 RX ADMIN — SODIUM CHLORIDE 250 MG: 9 INJECTION, SOLUTION INTRAVENOUS at 16:18

## 2019-10-20 RX ADMIN — VALPROATE SODIUM 500 MG: 100 INJECTION, SOLUTION INTRAVENOUS at 17:55

## 2019-10-20 RX ADMIN — LATANOPROST 2 DROP: 50 SOLUTION OPHTHALMIC at 20:50

## 2019-10-20 RX ADMIN — HYDRALAZINE HYDROCHLORIDE 20 MG: 20 INJECTION INTRAMUSCULAR; INTRAVENOUS at 17:58

## 2019-10-20 RX ADMIN — VALPROATE SODIUM 500 MG: 100 INJECTION, SOLUTION INTRAVENOUS at 10:19

## 2019-10-20 RX ADMIN — SODIUM CHLORIDE: 9 INJECTION, SOLUTION INTRAVENOUS at 04:30

## 2019-10-20 RX ADMIN — TIMOLOL MALEATE 1 DROP: 5 SOLUTION OPHTHALMIC at 06:20

## 2019-10-20 RX ADMIN — ATORVASTATIN CALCIUM 80 MG: 40 TABLET, FILM COATED ORAL at 20:50

## 2019-10-20 RX ADMIN — SODIUM CHLORIDE 500 ML: 234 INJECTION, SOLUTION INTRAVENOUS at 23:26

## 2019-10-20 RX ADMIN — SODIUM CHLORIDE 500 ML: 234 INJECTION, SOLUTION INTRAVENOUS at 12:18

## 2019-10-20 RX ADMIN — ASPIRIN 650 MG: 325 TABLET, FILM COATED ORAL at 11:29

## 2019-10-20 ASSESSMENT — ENCOUNTER SYMPTOMS
BACK PAIN: 0
FEVER: 0
NECK PAIN: 0
SHORTNESS OF BREATH: 0
TINGLING: 0
HEMOPTYSIS: 0
WHEEZING: 0
SPEECH CHANGE: 0
HEADACHES: 1
FALLS: 0
VOMITING: 1
POLYDIPSIA: 0
ABDOMINAL PAIN: 0
DIAPHORESIS: 0
CONSTIPATION: 0
FOCAL WEAKNESS: 0
CHILLS: 0
DIARRHEA: 0
VOMITING: 0
MYALGIAS: 0
PHOTOPHOBIA: 1
NERVOUS/ANXIOUS: 0
COUGH: 0
SPUTUM PRODUCTION: 0
NAUSEA: 1
WEIGHT LOSS: 0
SORE THROAT: 0
WEAKNESS: 1
BLOOD IN STOOL: 0
PALPITATIONS: 0
PHOTOPHOBIA: 0
DOUBLE VISION: 0
DIZZINESS: 0
BLURRED VISION: 0
SENSORY CHANGE: 0
NECK PAIN: 1
HEARTBURN: 0
STRIDOR: 0
BRUISES/BLEEDS EASILY: 0
DEPRESSION: 0
SINUS PAIN: 0
DIZZINESS: 1

## 2019-10-20 ASSESSMENT — COGNITIVE AND FUNCTIONAL STATUS - GENERAL
SUGGESTED CMS G CODE MODIFIER MOBILITY: CJ
CLIMB 3 TO 5 STEPS WITH RAILING: A LITTLE
WALKING IN HOSPITAL ROOM: A LITTLE
STANDING UP FROM CHAIR USING ARMS: A LITTLE
MOBILITY SCORE: 21
SUGGESTED CMS G CODE MODIFIER DAILY ACTIVITY: CH
DAILY ACTIVITIY SCORE: 24

## 2019-10-20 ASSESSMENT — PATIENT HEALTH QUESTIONNAIRE - PHQ9
SUM OF ALL RESPONSES TO PHQ9 QUESTIONS 1 AND 2: 0
1. LITTLE INTEREST OR PLEASURE IN DOING THINGS: NOT AT ALL
2. FEELING DOWN, DEPRESSED, IRRITABLE, OR HOPELESS: NOT AT ALL

## 2019-10-20 NOTE — ASSESSMENT & PLAN NOTE
Right side cerebellar infarct  PICA distribution  Keep sbp <160 mmHg  Amlodipine 10 mg daily, prn medications  No intervention for now per neurosurgery  ASA but not dual antiplatelet  Possible embolic, has hx aortic aneurysm  Patient refusing NaCl tabs, continue to wean 3% infusion to maintain normonatremia  Statin  MRI 10/19  Neuro and Neurosx

## 2019-10-20 NOTE — CONSULTS
INFECTIOUS DISEASES INPATIENT CONSULT NOTE     Date of Service: 10/20/2019    Consult Requested By: Tyrone Rucker M.D.    Reason for Consultation: Headache and neck pain    History of Present Illness:   Jacinta Jauregui is a 69 y.o. man with history of glaucoma and hypertension admitted 10/17/2019 for headache and neck pain.  Patient was in his usual state of health until Thursday prior to admission when he was driving and developed acute onset of a posterior headache associated with neck pain and neck stiffness.  Patient denies any eye pain.  Denies any associated fevers, or chills but did endorse nausea and vomiting.  He also noted dizziness.  He denied any cough, chest pain or shortness of breath.  Due to his symptoms, he asked his wife to drive to the nearest emergency department.  On presentation, he was afebrile without any leukocytosis.  CTA of the head and neck were unremarkable.  Due to concerns for possible underlying meningitis, he also underwent a lumbar puncture with CSF analysis revealing 1 WBC, normal glucose and 49 protein.  CSF cultures are negative.  He has not been started on antibiotics.  MRI of the brain now reveals a moderately large acute area of infarction involving the right pepe-cerebellum.  Infectious disease service consulted regarding CSF fluid analysis and antibiotic recommendations.      Review of Systems   Constitutional: Negative for chills and fever.   Eyes: Negative for blurred vision.   Respiratory: Negative for cough and shortness of breath.    Cardiovascular: Negative for chest pain.   Gastrointestinal: Positive for nausea and vomiting. Negative for abdominal pain and diarrhea.   Genitourinary: Negative for dysuria.   Musculoskeletal: Positive for neck pain.   Neurological: Positive for dizziness and headaches.   All other systems reviewed and are negative.      PMH:   Past Medical History:   Diagnosis Date   • Hypertension    Glaucoma    PSH:  Left shoulder surgery    FAMILY  HX:  Family History   Problem Relation Age of Onset   • No Known Problems Mother    • No Known Problems Father    Reviewed and negative per patient    SOCIAL HX:  Social History     Socioeconomic History   • Marital status:      Spouse name: Not on file   • Number of children: Not on file   • Years of education: Not on file   • Highest education level: Not on file   Occupational History   • Not on file   Social Needs   • Financial resource strain: Not on file   • Food insecurity:     Worry: Not on file     Inability: Not on file   • Transportation needs:     Medical: Not on file     Non-medical: Not on file   Tobacco Use   • Smoking status: Never Smoker   • Smokeless tobacco: Never Used   Substance and Sexual Activity   • Alcohol use: Yes     Frequency: 2-3 times a week   • Drug use: Never   • Sexual activity: Not on file   Lifestyle   • Physical activity:     Days per week: Not on file     Minutes per session: Not on file   • Stress: Not on file   Relationships   • Social connections:     Talks on phone: Not on file     Gets together: Not on file     Attends Tenriism service: Not on file     Active member of club or organization: Not on file     Attends meetings of clubs or organizations: Not on file     Relationship status: Not on file   • Intimate partner violence:     Fear of current or ex partner: Not on file     Emotionally abused: Not on file     Physically abused: Not on file     Forced sexual activity: Not on file   Other Topics Concern   • Not on file   Social History Narrative   • Not on file     Social History     Tobacco Use   Smoking Status Never Smoker   Smokeless Tobacco Never Used     Social History     Substance and Sexual Activity   Alcohol Use Yes   • Frequency: 2-3 times a week       Allergies/Intolerances:  No Known Allergies    History reviewed with the patient     Other Current Medications:    Current Facility-Administered Medications:   •  lidocaine (LIDODERM) 5 % 2 Patch, 2 Patch,  Transdermal, Q24HR, Tyrone Rucker M.D., Stopped at 10/19/19 1500  •  amLODIPine (NORVASC) tablet 10 mg, 10 mg, Oral, DAILY, Moose English M.D., 10 mg at 10/20/19 0620  •  latanoprost (XALATAN) 0.005 % ophthalmic solution 2 Drop, 2 Drop, Both Eyes, Nightly, Moose English M.D., 2 Drop at 10/19/19 1932  •  timolol (TIMOPTIC) 0.5 % ophthalmic solution 1 Drop, 1 Drop, Right Eye, QAM, Moose English M.D., 1 Drop at 10/20/19 0620  •  NS infusion, , Intravenous, Continuous, Moose English M.D., Last Rate: 83 mL/hr at 10/20/19 0700  •  acetaminophen (TYLENOL) tablet 650 mg, 650 mg, Oral, Q6HRS PRN, Moose English M.D., 650 mg at 10/19/19 1941  •  Notify provider if pain remains uncontrolled, , , CONTINUOUS **AND** Use the numeric rating scale (NRS-11) on regular floors and Critical-Care Pain Observation Tool (CPOT) on ICUs/Trauma to assess pain, , , CONTINUOUS **AND** Pulse Ox (Oximetry), , , CONTINUOUS **AND** Pharmacy Consult Request ...Pain Management Review 1 Each, 1 Each, Other, PHARMACY TO DOSE **AND** If patient difficult to arouse and/or has respiratory depression, stop any opiates that are currently infusing and call a Rapid Response., , , CONTINUOUS **AND** oxyCODONE immediate-release (ROXICODONE) tablet 2.5 mg, 2.5 mg, Oral, Q3HRS PRN, Stopped at 10/19/19 1002 **AND** oxyCODONE immediate-release (ROXICODONE) tablet 5 mg, 5 mg, Oral, Q3HRS PRN, 5 mg at 10/19/19 1004 **AND** morphine (pf) 4 MG/ML injection 2 mg, 2 mg, Intravenous, Q3HRS PRN, Moose English M.D.  •  ondansetron (ZOFRAN) syringe/vial injection 4 mg, 4 mg, Intravenous, Q4HRS PRN, Moose English M.D., 4 mg at 10/18/19 1445  •  ondansetron (ZOFRAN ODT) dispertab 4 mg, 4 mg, Oral, Q4HRS PRN, Moose English M.D.  •  senna-docusate (PERICOLACE or SENOKOT S) 8.6-50 MG per tablet 2 Tab, 2 Tab, Oral, BID **AND** polyethylene glycol/lytes (MIRALAX) PACKET 1 Packet, 1 Packet, Oral, QDAY PRN **AND** magnesium hydroxide (MILK OF MAGNESIA) suspension  "30 mL, 30 mL, Oral, QDAY PRN **AND** bisacodyl (DULCOLAX) suppository 10 mg, 10 mg, Rectal, QDAY PRN, Moose English M.D.  [unfilled]    Most Recent Vital Signs:  /93   Pulse 62   Temp 37.3 °C (99.2 °F) (Temporal)   Resp 16   Ht 1.803 m (5' 11\")   Wt 79 kg (174 lb 2.6 oz)   SpO2 95%   BMI 24.29 kg/m²   Temp  Av °C (98.6 °F)  Min: 36.6 °C (97.8 °F)  Max: 37.5 °C (99.5 °F)    Physical Exam:  General: well nourished, no diaphoresis, well-appearing, no acute distress, nontoxic  HEENT: sclera anicteric, PERRL, extraocular muscles intact, mucous membranes moist, oropharynx clear and moist, no oral lesions or exudate  Neck: supple, no lymphadenopathy, no nuchal rigidity or meningismus  Chest: CTAB, no rales, rhonchi or wheezes, normal work of breathing.  Cardiac: regular rate and rhythm, normal S1 S2, no murmurs, rubs or gallops  Abdomen: + bowel sounds, soft, non-tender, non-distended, no hepatosplenomegaly  Extremities: WWP, no edema, 2+ pedal pulses  Skin: warm and dry, no rashes or worrisome lesions  Neuro: Alert and oriented times 3, non-focal exam, speech fluent, full range of motion to bilateral upper and lower extremities  Psych: normal mood and behavior, pleasant; memory intact, normal judgement    Pertinent Lab Results:  Recent Labs     10/18/19  0215 10/19/19  0320 10/20/19  0415   WBC 6.8 9.1 6.8      Recent Labs     10/18/19  0215 10/19/19  0320 10/20/19  0415   HEMOGLOBIN 17.4 16.5 17.1   HEMATOCRIT 49.7 49.2 48.3   MCV 91.5 92.3 90.1   MCH 32.0 31.0 31.9   PLATELETCT 220 203 182         Recent Labs     10/18/19  0215 10/19/19  0320 10/20/19  041   SODIUM 143 139 138   POTASSIUM 4.3 3.7 3.7   CHLORIDE 104 106 105   CO2 26 23 24   CREATININE 0.61 0.58 0.56        Recent Labs     10/19/19  0320 10/20/19  0415   ALBUMIN 3.7 3.6        Pertinent Micro:  Results     Procedure Component Value Units Date/Time    CSF CULTURE [[id number]] Collected:  10/18/19 0915    Order Status:  " "Completed Specimen:  CSF from Tap Updated:  10/19/19 1117     Significant Indicator NEG     Source CSF     Site TAP     Culture Result No growth at 24 hours.     Gram Stain Result No organisms seen.    Influenza A/B By PCR (Adult - Flu Only) [[id number]] Collected:  10/19/19 1015    Order Status:  Completed Specimen:  Respirate from Nasopharyngeal Updated:  10/19/19 1107     Influenza virus A RNA Negative     Influenza virus B, PCR Negative    BLOOD CULTURE [[id number]] Collected:  10/18/19 1830    Order Status:  Completed Specimen:  Blood from Peripheral Updated:  10/19/19 0852     Significant Indicator NEG     Source BLD     Site PERIPHERAL     Culture Result No Growth  Note: Blood cultures are incubated for 5 days and  are monitored continuously.Positive blood cultures  are called to the RN and reported as soon as  they are identified.      Narrative:       Per Hospital Policy: Only change Specimen Src: to \"Line\" if  specified by physician order.  Left Forearm/Arm    BLOOD CULTURE [[id number]] Collected:  10/18/19 1840    Order Status:  Completed Specimen:  Blood from Peripheral Updated:  10/19/19 0852     Significant Indicator NEG     Source BLD     Site PERIPHERAL     Culture Result No Growth  Note: Blood cultures are incubated for 5 days and  are monitored continuously.Positive blood cultures  are called to the RN and reported as soon as  they are identified.      Narrative:       Per Hospital Policy: Only change Specimen Src: to \"Line\" if  specified by physician order.  Right Forearm/Arm    URINALYSIS [[id number]]  (Abnormal) Collected:  10/18/19 1900    Order Status:  Completed Specimen:  Urine, Clean Catch Updated:  10/18/19 1929     Color Yellow     Character Clear     Specific Gravity 1.015     Ph 6.5     Glucose Negative mg/dL      Ketones 15 mg/dL      Protein Negative mg/dL      Bilirubin Negative     Urobilinogen, Urine 1.0     Nitrite Negative     Leukocyte Esterase Negative     Occult Blood Trace     " Micro Urine Req Microscopic    GRAM STAIN [[id number]] Collected:  10/18/19 0915    Order Status:  Completed Specimen:  CSF Updated:  10/18/19 1047     Significant Indicator .     Source CSF     Site TAP     Gram Stain Result No organisms seen.        No results found for: BLOODCULTU, BLDCULT, BCHOLD     Studies:  Ct-cta Head With & W/o-post Process    Result Date: 10/17/2019  10/17/2019 8:27 PM HISTORY/REASON FOR EXAM:  Headache, intracranial hemorrhage suspected TECHNIQUE/EXAM DESCRIPTION: CT angiogram of the [place] without and with contrast.  Initial precontrast images were obtained of the head from the skull base through the vertex.  Postcontrast images were obtained of the [place] following the power injection of nonionic contrast at 5.0 mL/sec. Thin-section helical images were obtained with overlapping reconstruction interval. Coronal and sagittal multiplanar volume reformats were generated.  3D angiographic images were reviewed on PACS.  Maximum intensity projection (MIP) images were generated and reviewed. 80 mL of Omnipaque 350 nonionic contrast was injected intravenously. Low dose optimization technique was utilized for this CT exam including automated exposure control and adjustment of the mA and/or kV according to patient size. COMPARISON:  None. FINDINGS: The posterior circulation shows the distal vertebral arteries to be patent. The vertebrobasilar confluence is intact. The basilar artery is patent. No aneurysm or occlusive lesion is evident. Persistent fetal morphology of the left posterior cerebral artery is seen. The anterior circulation shows no stenotic or occlusive lesion. No aneurysm is evident about the [place]. The brain is unremarkable. 3D angiographic/MIP images of the vasculature confirm the vascular findings as described above.     1.  CT angiogram of the [place] within normal limits.     Ct-cta Neck With & W/o-post Processing    Result Date:  10/17/2019  10/17/2019 8:27 PM HISTORY/REASON FOR EXAM:  Subarachnoid hemorrhage (SAH) suspected TECHNIQUE/EXAM DESCRIPTION: CT angiogram of the neck with contrast. Postcontrast images were obtained of the neck from the great vessels through the skull base following the power injection of nonionic contrast at 5.0 mL/sec. Thin-section helical images were obtained with overlapping reconstruction interval. Coronal and oblique multiplanar volume reformats were generated. Cervical internal carotid artery percent stenosis is calculated using the standard method according to the NASCET criteria wherein a segment of uniform caliber mid or distal cervical internal carotid is used as the reference denominator. 3D angiographic images were reviewed on PACS.  Maximum intensity projection (MIP) images were generated and reviewed 80 mL of Omnipaque 350 nonionic contrast was injected intravenously. Low dose optimization technique was utilized for this CT exam including automated exposure control and adjustment of the mA and/or kV according to patient size. COMPARISON:  None. FINDINGS: The arch origins of the great vessels appear intact. There is 4.2 cm dilatation of the descending thoracic aorta. The right common carotid artery, cervical carotid bifurcation, and cervical internal carotid artery are within normal limits. There is no evidence of significant stenosis, thrombus, or other filling defect or ulceration. There is no evidence of dissection or aneurysm. The left common carotid artery, cervical carotid bifurcation, and cervical internal carotid artery are within normal limits. There is no evidence of significant stenosis, thrombus, or other filling defect or ulceration. There is no evidence of dissection  or aneurysm. The cervical vertebral arteries are normal bilaterally. The neck soft tissues and lung apices in the field of view are unremarkable. 3D angiographic/MIP images of the vasculature confirm the vascular findings as  described above.     1.  4.2 cm ascending thoracic aortic aneurysm, radiographic follow-up and surveillance recommended as clinically appropriate. 2.  Otherwise unremarkable CT angiogram of the neck. These findings were discussed with the patient's clinician, ANDREIA GIRON, on 10/17/2019 9:37 PM.    Dx-chest-2 Views    Result Date: 10/18/2019  10/18/2019 7:55 PM HISTORY/REASON FOR EXAM:  Fever. TECHNIQUE/EXAM DESCRIPTION AND NUMBER OF VIEWS: Two views of the chest. COMPARISON:  None. FINDINGS: There is linear density at the right lung base consistent with atelectasis or scar. The cardiac silhouette: normal in size. Pleura: There are no pleural effusion or pneumothoraces. Osseous structures: There is a left shoulder arthroplasty.     No acute cardiopulmonary abnormality identified.    Dx-lumbar Puncture For Diagnosis    Result Date: 10/18/2019  10/18/2019 8:33 AM HISTORY/REASON FOR EXAM:  Headache with concern of spontaneous subarachnoid hemorrhage. TECHNIQUE/EXAM DESCRIPTION AND NUMBER OF VIEWS: Fluoroscopic-guided lumbar puncture. 1 fluoroscopic images obtained. Fluoroscopy time: 1 minutes PROCEDURE:     Informed consent was obtained. A timeout was taken. With the patient in prone position, the lumbar region was prepped and draped in a sterile manner. Following local anesthesia with 1% lidocaine, a 22-gauge spinal needle was advanced into the subarachnoid space at the L3-4 level.  Approximately 3 cc of CSF was collected and the specimens sent to the lab for the studies requested. The patient tolerated the procedure well with no evidence of complication.     Fluoroscopic-guided lumbar puncture as described above.    Mr-brain-with & W/o    Result Date: 10/19/2019  10/19/2019 2:52 PM HISTORY/REASON FOR EXAM:  Headache, acute, and Dizziness, non-specific. TECHNIQUE/EXAM DESCRIPTION:   MRI of the brain without and with contrast. T1 sagittal, T2 fast spin-echo axial, T1 coronal, FLAIR coronal, diffusion-weighted and  apparent diffusion coefficient (ADC map) axial images were obtained of the whole brain. T1 postcontrast axial and T1 postcontrast coronal images were obtained. The study was performed on a [employer] Signa 1.5 Bita MRI scanner. 15 mL ProHance contrast was administered intravenously. COMPARISON:  None. FINDINGS: There is a moderately large wedge-shaped area of increased T2 and diffusion-weighted signal intensity involving the right hemicerebellum and adjacent cerebellar vermis most pronounced inferiorly. The calvariae are unremarkable. There are no extra-axial fluid collections. The ventricular system and basal cisterns are mildly prominent. There is mild prominence the cortical sulci and gyri. There are rare scattered punctate foci of increased T2 signal intensity in the periventricular white matter. There are no mass effects or shift of midline structures. There are no hemorrhagic lesions. The postcontrast images show no areas of abnormal parenchymal or meningeal enhancement. The brainstem and posterior fossa structures are unremarkable. Vascular flow voids in the vertebrobasilar and carotid arteries, [place], and dural venous sinuses are intact. The paranasal sinuses and mastoids in the field of view are unremarkable.     1.  Moderately large acute area of infarction involving the right hemicerebellum inferiorly and adjacent cerebellar vermis in the distribution of the posterior inferior cerebellar artery. 2.  Age-related cerebral atrophy. 3.  Mild periventricular white matter changes consistent with chronic microvascular ischemic gliosis.    Mr-cervical Spine-w/o    Result Date: 10/19/2019  10/18/2019 5:30 PM HISTORY/REASON FOR EXAM:  Neck pain, and right shoulder pain. TECHNIQUE/EXAM DESCRIPTION: MRI of the cervical spine without contrast. The study was performed on a [employer] Signa 1.5 Bita MRI scanner. T1 sagittal, T2 fast spin-echo sagittal, and gradient echo axial images were obtained of the cervical spine.  T2 fat suppressed sagittal images were also obtained. Optional T2 axial images may also be included. COMPARISON: None. FINDINGS: Alignment in the cervical spine is normal. Marrow signal in the vertebral bodies is within normal limits. The disc spaces are moderate to severely narrowed from the C3-4 level through the C5-6 level. Further there are bandlike endplate degenerative changes at these levels. There are mild marginal osteophytic changes at these levels. The prevertebral and paraspinous soft tissues are unremarkable. There are no anomalies at the craniovertebral junction. The cervical spinal cord is normal in caliber and signal throughout its course. Level specific findings: C2-3 level normal. C3-4 level minimal posterior spurring which abuts the ventral surface of the cord. Severe bilateral neural foraminal narrowing secondary to uncinate hypertrophy. C4-5 level mild posterior spurring which abuts the ventral surface of the cord. Moderate bilateral neural foraminal narrowing. C5-6 level mild posterior spurring causing moderate effacement of thecal sac. Moderate bilateral neural foraminal narrowing. C6-7 level minimal posterior spurring. Mild bilateral neural foraminal narrowing. C7-T1 level normal.     1.  Moderate to severe discal and endplate degenerative changes from the C3-4 level through the C5-6 level with mild marginal osteophytosis. 2.  Minimal to mild multilevel cervical spondylotic change from the C3-4 level through the C6-7 level most pronounced at the C4-5 level. 3.  Moderate to severe multilevel neural foraminal narrowing.      IMPRESSION:   1.  Acute cerebrovascular accident     2.  Headache  3.  Neck pain  4.  Hypertension      PLAN:   Jacinta Jauregui is a 69 y.o. man with a history of hypertension and glaucoma admitted for acute onset of a posterior headache associated with neck pain and stiffness.  Due to concerns for meningitis, he underwent a lumbar puncture on presentation with CSF analysis  not consistent with meningitis.  Thus antibiotics are not indicated at this time.  MRI now revealing an acute infarct in the right pepe-cerebellum which explains his clinical presentation and symptoms.      Plan of care discussed with CLAUS Rucker M.D.. Will sign off.    Blessing Romero M.D.      Please note that this dictation was created using voice recognition software. I have worked with technical experts from [facility] to optimize the interface.  I have made every reasonable attempt to correct obvious errors, but there may be errors of grammar and possibly content that I did not discover before finalizing the note.

## 2019-10-20 NOTE — PROCEDURES
Procedure Note    Date: 10/20/2019  Time: 1430    Procedure: Central Venous Line placement  Site: RIJ vein    Indication: CVA, administration of 3% NS, venous access  Consent: Informed consent obtained from patient or designated decision maker after explaining the benefits/risks of the procedure including but not limited to bleeding, infection, nerve or other deep structure injury, pneumothorax/hemothorax, arrythmia, or death. Patient or surrogate expressed understanding and agreement and signed consent which can be found in the patient's chart.    Procedure: After obtaining consent, a time-out was performed. Appropriate site confirmed with ultrasound and patient positioned, prepped, and draped in sterile fashion. All those present wearing cap and mask and those physically participating remained adhering to sterile fashion with cap, mask, gloves, and gown. 5 mL of local anesthetic injected (1% lidocaine without epinephrine) achieving appropriate comfort level for patient. Vein localized and accessed using continuous ultrasound guidance and a 7 Fr three lumen catheter placed using Seldinger technique. Able to aspirate dark, non-pulsatile blood through each lumen and sterile saline flushed easily before capping. Line secured and dressed in sterile fashion. Patient tolerated procedure well without any difficulties and remains in care of bedside nurse. CXR will be performed to confirm appropriate placement for internal jugular or subclavian CVLs.    EBL: minimal  Complications: None  CXR: Pending, will follow up for proper placement    Nhan PITTS Resident

## 2019-10-20 NOTE — PROGRESS NOTES
[facility] Medicine Daily Progress Note    Date of Service  10/20/2019    Chief Complaint  Right head and neck pain with weakness, nausea and drowsiness    Hospital Course    69 y.o. male admitted 10/17/2019 with a past medical history of essential hypertension which is well controlled and glaucoma.  Patient presented on 10/17/2019 with sudden onset of pain in the back of his neck in the afternoon while driving. It resolved, then returned accompanied by visual change.  Patient asked his wife to drive and they went to the emergency department. At that facility there was concern for subarachnoid hemorrhage and he was transferred to [facility].  In the emergency department he had a normal CTA of the head and neck. His family at bedside states he had difficulty balancing when standing up.  His visual changes have resolved and he denies blurriness.      Interval Problem Update  Patient continues to have drowsiness, weakness, nausea and right-sided head and neck pain and photosensitivity.  No changes in neurological signs or symptoms today.  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  Discussed MRI findings with neurology and neurosurgery.    Consultations  Neurology  Neurosurgery  Infectious disease, signed off    Code Status  Full    Disposition  To be determined    Review of Systems  Review of Systems   Constitutional: Positive for malaise/fatigue. Negative for chills, diaphoresis and fever.   HENT: Negative for congestion, sinus pain and sore throat.    Eyes: Positive for photophobia. Negative for blurred vision and double vision.   Respiratory: Negative for cough, sputum production, shortness of breath and wheezing.    Cardiovascular: Negative for chest pain, palpitations and leg swelling.   Gastrointestinal: Positive for nausea. Negative for abdominal pain, blood in stool, constipation, diarrhea, heartburn, melena and vomiting.   Musculoskeletal: Positive for neck  pain. Negative for back pain, falls, joint pain and myalgias.   Skin: Negative for itching and rash.   Neurological: Positive for weakness and headaches. Negative for dizziness.        Physical Exam  Temp:  [36.6 °C (97.9 °F)-37.3 °C (99.2 °F)] 37.3 °C (99.1 °F)  Pulse:  [58-69] 60  Resp:  [13-18] 13  BP: (143-164)/() 160/113  SpO2:  [92 %-96 %] 92 %    Physical Exam   Constitutional: He is oriented to person, place, and time. Vital signs are normal. He appears well-developed and well-nourished. He is active and cooperative. No distress.   Drowsy   HENT:   Head: Normocephalic and atraumatic.   Right Ear: External ear normal.   Left Ear: External ear normal.   Nose: Nose normal.   Eyes: Pupils are equal, round, and reactive to light. EOM are normal. Right eye exhibits no discharge. Left eye exhibits no discharge. Right conjunctiva is injected. No scleral icterus.   Neck: Normal range of motion. Neck supple. No JVD present.   Cardiovascular: Normal rate, regular rhythm and normal heart sounds. Exam reveals no gallop and no friction rub.   No murmur heard.  Pulmonary/Chest: Effort normal and breath sounds normal. No respiratory distress. He has no wheezes. He has no rales. He exhibits no tenderness.   Abdominal: Soft. Bowel sounds are normal. He exhibits no distension and no mass. There is no tenderness. There is no rebound and no guarding.   Musculoskeletal: Normal range of motion. He exhibits no edema or tenderness.        Right shoulder: He exhibits normal range of motion and normal strength.   5/5 strength in all extremities   Neurological: He is oriented to person, place, and time. No cranial nerve deficit. Coordination normal.   Skin: Skin is warm, dry and intact. No rash noted. He is not diaphoretic. No erythema. No pallor.   Psychiatric: He has a normal mood and affect. His speech is normal and behavior is normal. Thought content normal.   Nursing note and vitals reviewed.    All systems reviewed and exam  is unchanged from yesterday.    Fluids    Intake/Output Summary (Last 24 hours) at 10/20/2019 1016  Last data filed at 10/20/2019 0900  Gross per 24 hour   Intake 2352 ml   Output 1490 ml   Net 862 ml       Laboratory  Recent Labs     10/18/19  0215 10/19/19  0320 10/20/19  0415   WBC 6.8 9.1 6.8   RBC 5.43 5.33 5.36   HEMOGLOBIN 17.4 16.5 17.1   HEMATOCRIT 49.7 49.2 48.3   MCV 91.5 92.3 90.1   MCH 32.0 31.0 31.9   MCHC 35.0 33.5* 35.4*   RDW 42.7 42.0 39.3   PLATELETCT 220 203 182   MPV 10.9 10.2 9.8     Recent Labs     10/18/19  0215 10/19/19  0320 10/20/19  0415   SODIUM 143 139 138   POTASSIUM 4.3 3.7 3.7   CHLORIDE 104 106 105   CO2 26 23 24   GLUCOSE 120* 96 91   BUN 18 12 13   CREATININE 0.61 0.58 0.56   CALCIUM 9.1 8.5 8.6     Recent Labs     10/18/19  0715   INR 1.09               Imaging  MR-BRAIN-WITH & W/O   Final Result         1.  Moderately large acute area of infarction involving the right hemicerebellum inferiorly and adjacent cerebellar vermis in the distribution of the posterior inferior cerebellar artery.      2.  Age-related cerebral atrophy.      3.  Mild periventricular white matter changes consistent with chronic microvascular ischemic gliosis.      DX-CHEST-2 VIEWS   Final Result      No acute cardiopulmonary abnormality identified.      MR-CERVICAL SPINE-W/O   Final Result         1.  Moderate to severe discal and endplate degenerative changes from the C3-4 level through the C5-6 level with mild marginal osteophytosis.      2.  Minimal to mild multilevel cervical spondylotic change from the C3-4 level through the C6-7 level most pronounced at the C4-5 level.      3.  Moderate to severe multilevel neural foraminal narrowing.      DX-LUMBAR PUNCTURE FOR DIAGNOSIS   Final Result      Fluoroscopic-guided lumbar puncture as described above.      CT-CTA HEAD WITH & W/O-POST PROCESS   Final Result         1.  CT angiogram of the [place] within normal limits.         CT-CTA NECK WITH &  W/O-POST PROCESSING   Final Result         1.  4.2 cm ascending thoracic aortic aneurysm, radiographic follow-up and surveillance recommended as clinically appropriate.   2.  Otherwise unremarkable CT angiogram of the neck.      These findings were discussed with the patient's clinician, ANDREIA GIRON, on 10/17/2019 9:37 PM.      EC-ECHOCARDIOGRAM COMPLETE W/O CONT    (Results Pending)        Assessment/Plan  * CVA (cerebral vascular accident) (HCC)- (present on admission)  Assessment & Plan  right cerebellar infarct in the area of the posterior inferior cerebellar artery presenting 10/17/2019  Started aspirin, Lipitor.  Amlodipine discontinued.  Transferred to ICU, neurology and neurosurgery consulted.  Echocardiogram pending.  Appreciate impression and recommendations from neurosurgery, following:  Local mass-effect only.  No hydrocephalus.   1.  Keep systolic blood pressure less than 160   2.  Aim for sodium 145-1 55   3. Repeat CT scan Monday  Neurology following  PT/OT/SLP consults pending    Neck pain- (present on admission)  Assessment & Plan  LP within normal limits.  Spinal fluid culture pending.  No change in neurological assessment.  CTA of head and neck negative for acute abnormalities, however showed some degenerative spinal changes at C4-C5 with disc space narrowing.  Cervical spine MRI reviewed by neurosurgery and not suspicious for causation of current symptoms.  Likely etiology is CVA.  Plan as above.    Headache- (present on admission)  Assessment & Plan  Accompanied by drowsiness and weakness. 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  Hantavirus test pending.  Patient is negative for chills, fever, cough, chest pain.  Blood cultures pending, negative to date.  CVA plan as above.    Essential hypertension- (present on admission)  Assessment & Plan  Keep systolic blood pressure less than 160 per neurosurgery.  PRN antihypertensives.    Glaucoma- (present on  admission)  Assessment & Plan  Continue current outpatient regimen.    No new blurriness.       VTE prophylaxis: SCDs

## 2019-10-20 NOTE — PROGRESS NOTES
Received  bedside report from Hope HINSON. Assumed pt care. Received pt in bed awake, up for meal, IVF infusing. Pt is AOx4, neuro intact, SR on monitor, on O2 @ 2lpm/NC. Reports mild headache, mild tolerable right sided pain. Plan of care reviewed and understood by patient. Medicated per MAR. Encouraged to verbalize feelings. Patient questions answered. Promoted rest and relaxation. Safety measures in place. Comfort measures offered. Reinforced use of call light. Will continue to monitor.

## 2019-10-20 NOTE — CONSULTS
Critical Care Consultation    Date of consult: 10/20/2019    Referring Physician  Tyrone Rucker M.D.    Reason for Consultation  Altered mental status    History of Presenting Illness  69 y.o. male who presented 10/17/2019 with headache.  CTA head neck normal.  MRI neck cervical degeneration, no acute intervention per NS.  MRI 10/19 showed right cerebellar infarction with some compression 4th ventricle.  Discussed with NS, no intervention.  Discussed with neurology, hypertonic saline.  SBP less than 160 per neurosurgery.  Patient is alert and oriented.  Headache on arrival ongoing for several days.  Says more dizziness and balance issues rather than focal weakness..  Right side dysmetria.      Code Status  Full Code    Review of Systems  Review of Systems   Constitutional: Positive for malaise/fatigue. Negative for chills, diaphoresis, fever and weight loss.   HENT: Negative for congestion and sinus pain.    Eyes: Negative for blurred vision, double vision and photophobia.   Respiratory: Negative for cough, hemoptysis, sputum production, shortness of breath, wheezing and stridor.    Cardiovascular: Negative for chest pain, palpitations and leg swelling.   Gastrointestinal: Negative for blood in stool, heartburn, melena and vomiting.   Genitourinary: Negative for dysuria and urgency.   Musculoskeletal: Negative for back pain, myalgias and neck pain.   Skin: Negative for itching and rash.   Neurological: Positive for dizziness and headaches. Negative for tingling, sensory change, speech change and focal weakness.   Endo/Heme/Allergies: Negative for polydipsia. Does not bruise/bleed easily.   Psychiatric/Behavioral: Negative for depression. The patient is not nervous/anxious.        Past Medical History   has a past medical history of Hypertension.    Surgical History   has no past surgical history on file.    Family History  family history includes No Known Problems in his father and mother.    Social History    reports that he has never smoked. He has never used smokeless tobacco. He reports that he drinks alcohol. He reports that he does not use drugs.    Medications  Home Medications     Reviewed by Ashley Squires (Pharmacy Tech) on 10/17/19 at 2301  Med List Status: Complete   Medication Last Dose Status   amLODIPine (NORVASC) 10 MG Tab 10/17/2019 Active   latanoprost (XALATAN) 0.005 % Solution 10/16/2019 Active   timolol (TIMOPTIC) 0.5 % Solution 10/17/2019 Active              Current Facility-Administered Medications   Medication Dose Route Frequency Provider Last Rate Last Dose   • atorvastatin (LIPITOR) tablet 80 mg  80 mg Oral QHS Tyrone Rucker M.D.       • aspirin (ASA) tablet 650 mg  650 mg Oral Once Qing Cartagena M.D.       • [START ON 10/21/2019] aspirin (ASA) chewable tab 81 mg  81 mg Oral DAILY Qing Cartagena M.D.       • acetaminophen/caffeine/butalbital 325-40-50 mg (FIORICET) -40 MG per tablet 1 Tab  1 Tab Oral Q6HRS PRN Qing Cartagena M.D.       • valproate (DEPACON) 500 mg in D5W 50 mL IVPB  500 mg Intravenous Q8HRS Qing Cartagena M.D.       • lidocaine (LIDODERM) 5 % 2 Patch  2 Patch Transdermal Q24HR Tyrone Rucker M.D.   Stopped at 10/19/19 1500   • latanoprost (XALATAN) 0.005 % ophthalmic solution 2 Drop  2 Drop Both Eyes Nightly Moose English M.D.   2 Drop at 10/19/19 1932   • timolol (TIMOPTIC) 0.5 % ophthalmic solution 1 Drop  1 Drop Right Eye QAM Moose English M.D.   1 Drop at 10/20/19 0620   • NS infusion   Intravenous Continuous Moose English M.D. 83 mL/hr at 10/20/19 0700     • Pharmacy Consult Request ...Pain Management Review 1 Each  1 Each Other PHARMACY TO DOSE Moose English M.D.       • ondansetron (ZOFRAN) syringe/vial injection 4 mg  4 mg Intravenous Q4HRS PRN Moose English M.D.   4 mg at 10/18/19 1445   • ondansetron (ZOFRAN ODT) dispertab 4 mg  4 mg Oral Q4HRS PRN Moose English M.D.       • senna-docusate (PERICOLACE  or SENOKOT S) 8.6-50 MG per tablet 2 Tab  2 Tab Oral BID Moose English M.D.        And   • polyethylene glycol/lytes (MIRALAX) PACKET 1 Packet  1 Packet Oral QDAY PRN Moose English M.D.        And   • magnesium hydroxide (MILK OF MAGNESIA) suspension 30 mL  30 mL Oral QDAY PRN Moose English M.D.        And   • bisacodyl (DULCOLAX) suppository 10 mg  10 mg Rectal QDAY PRN Moose English M.D.           Allergies  No Known Allergies    Vital Signs last 24 hours  Temp:  [36.6 °C (97.9 °F)-37.3 °C (99.2 °F)] 37 °C (98.6 °F)  Pulse:  [58-67] 60  Resp:  [14-17] 17  BP: (143-164)/(87-99) 164/99  SpO2:  [92 %-96 %] 95 %    Physical Exam  Physical Exam   Constitutional: He is oriented to person, place, and time. He appears well-developed and well-nourished. No distress.   HENT:   Head: Normocephalic and atraumatic.   Right Ear: External ear normal.   Left Ear: External ear normal.   Mouth/Throat: No oropharyngeal exudate.   Eyes: Pupils are equal, round, and reactive to light. Conjunctivae and EOM are normal. Right eye exhibits no discharge. Left eye exhibits no discharge.   Neck: No JVD present. No tracheal deviation present.   Cardiovascular: Normal rate, regular rhythm and normal heart sounds.   No murmur heard.  Pulmonary/Chest: Effort normal and breath sounds normal. No stridor. No respiratory distress. He has no wheezes. He has no rales. He exhibits no tenderness.   Abdominal: He exhibits no mass. There is no tenderness. There is no rebound and no guarding.   Musculoskeletal: He exhibits no edema, tenderness or deformity.   Neurological: He is alert and oriented to person, place, and time. He displays normal reflexes. A cranial nerve deficit is present. Coordination abnormal.   Rt side dysmetria.  Slight pronator drift   Skin: Skin is warm and dry. No rash noted. He is not diaphoretic. No erythema.   Psychiatric: He has a normal mood and affect. His behavior is normal.   Nursing note and vitals  reviewed.      Fluids    Intake/Output Summary (Last 24 hours) at 10/20/2019 0847  Last data filed at 10/20/2019 0804  Gross per 24 hour   Intake 2352 ml   Output 1190 ml   Net 1162 ml       Laboratory  Recent Results (from the past 48 hour(s))   CSF Cell Count    Collection Time: 10/18/19  9:15 AM   Result Value Ref Range    Number Of Tubes 3     Volume 2.5 mL    Color-Body Fluid Colorless     Character-Body Fluid Clear     Supernatant Appearance Colorless     Total RBC Count 24 cells/uL    Crenated RBC 0 %    Total WBC Count 1 0 - 10 cells/uL    Polys 6 %    Lymphs 66 %    Mononuclear Cells - CSF 28 %    Comments See Comment     CSF Tube Number 3    CSF GLUCOSE    Collection Time: 10/18/19  9:15 AM   Result Value Ref Range    Glucose CSF 79 40 - 80 mg/dL   CSF PROTEIN    Collection Time: 10/18/19  9:15 AM   Result Value Ref Range    Total Protein, CSF 49 (H) 15 - 45 mg/dL   CSF CULTURE    Collection Time: 10/18/19  9:15 AM   Result Value Ref Range    Significant Indicator NEG     Source CSF     Site TAP     Culture Result No growth at 24 hours.     Gram Stain Result No organisms seen.    GRAM STAIN    Collection Time: 10/18/19  9:15 AM   Result Value Ref Range    Significant Indicator .     Source CSF     Site TAP     Gram Stain Result No organisms seen.    BLOOD CULTURE    Collection Time: 10/18/19  6:30 PM   Result Value Ref Range    Significant Indicator NEG     Source BLD     Site PERIPHERAL     Culture Result       No Growth  Note: Blood cultures are incubated for 5 days and  are monitored continuously.Positive blood cultures  are called to the RN and reported as soon as  they are identified.     BLOOD CULTURE    Collection Time: 10/18/19  6:40 PM   Result Value Ref Range    Significant Indicator NEG     Source BLD     Site PERIPHERAL     Culture Result       No Growth  Note: Blood cultures are incubated for 5 days and  are monitored continuously.Positive blood cultures  are called to the RN and reported as  soon as  they are identified.     URINALYSIS    Collection Time: 10/18/19  7:00 PM   Result Value Ref Range    Color Yellow     Character Clear     Specific Gravity 1.015 <1.035    Ph 6.5 5.0 - 8.0    Glucose Negative Negative mg/dL    Ketones 15 (A) Negative mg/dL    Protein Negative Negative mg/dL    Bilirubin Negative Negative    Urobilinogen, Urine 1.0 Negative    Nitrite Negative Negative    Leukocyte Esterase Negative Negative    Occult Blood Trace (A) Negative    Micro Urine Req Microscopic    URINE MICROSCOPIC (W/UA)    Collection Time: 10/18/19  7:00 PM   Result Value Ref Range    WBC 0-2 (A) /hpf    RBC 0-2 (A) /hpf    Bacteria Negative None /hpf    Epithelial Cells Negative /hpf    Hyaline Cast 0-2 /lpf   CBC WITH DIFFERENTIAL    Collection Time: 10/19/19  3:20 AM   Result Value Ref Range    WBC 9.1 4.8 - 10.8 K/uL    RBC 5.33 4.70 - 6.10 M/uL    Hemoglobin 16.5 14.0 - 18.0 g/dL    Hematocrit 49.2 42.0 - 52.0 %    MCV 92.3 81.4 - 97.8 fL    MCH 31.0 27.0 - 33.0 pg    MCHC 33.5 (L) 33.7 - 35.3 g/dL    RDW 42.0 35.9 - 50.0 fL    Platelet Count 203 164 - 446 K/uL    MPV 10.2 9.0 - 12.9 fL    Neutrophils-Polys 66.20 44.00 - 72.00 %    Lymphocytes 21.80 (L) 22.00 - 41.00 %    Monocytes 11.30 0.00 - 13.40 %    Eosinophils 0.20 0.00 - 6.90 %    Basophils 0.30 0.00 - 1.80 %    Immature Granulocytes 0.20 0.00 - 0.90 %    Nucleated RBC 0.00 /100 WBC    Neutrophils (Absolute) 6.04 1.82 - 7.42 K/uL    Lymphs (Absolute) 1.99 1.00 - 4.80 K/uL    Monos (Absolute) 1.03 (H) 0.00 - 0.85 K/uL    Eos (Absolute) 0.02 0.00 - 0.51 K/uL    Baso (Absolute) 0.03 0.00 - 0.12 K/uL    Immature Granulocytes (abs) 0.02 0.00 - 0.11 K/uL    NRBC (Absolute) 0.00 K/uL   Comp Metabolic Panel    Collection Time: 10/19/19  3:20 AM   Result Value Ref Range    Sodium 139 135 - 145 mmol/L    Potassium 3.7 3.6 - 5.5 mmol/L    Chloride 106 96 - 112 mmol/L    Co2 23 20 - 33 mmol/L    Anion Gap 10.0 0.0 - 11.9    Glucose 96 65 - 99 mg/dL    Bun 12 8  - 22 mg/dL    Creatinine 0.58 0.50 - 1.40 mg/dL    Calcium 8.5 8.5 - 10.5 mg/dL    AST(SGOT) 16 12 - 45 U/L    ALT(SGPT) 11 2 - 50 U/L    Alkaline Phosphatase 43 30 - 99 U/L    Total Bilirubin 1.2 0.1 - 1.5 mg/dL    Albumin 3.7 3.2 - 4.9 g/dL    Total Protein 5.6 (L) 6.0 - 8.2 g/dL    Globulin 1.9 1.9 - 3.5 g/dL    A-G Ratio 1.9 g/dL   ESTIMATED GFR    Collection Time: 10/19/19  3:20 AM   Result Value Ref Range    GFR If African American >60 >60 mL/min/1.73 m 2    GFR If Non African American >60 >60 mL/min/1.73 m 2   CRP QUANTITIVE (NON-CARDIAC)    Collection Time: 10/19/19  3:20 AM   Result Value Ref Range    Stat C-Reactive Protein 0.18 0.00 - 0.75 mg/dL   Influenza A/B By PCR (Adult - Flu Only)    Collection Time: 10/19/19 10:15 AM   Result Value Ref Range    Influenza virus A RNA Negative Negative    Influenza virus B, PCR Negative Negative   WESTERGREN SED RATE    Collection Time: 10/19/19  2:36 PM   Result Value Ref Range    Sed Rate Westergren 2 0 - 20 mm/hour   CBC WITH DIFFERENTIAL    Collection Time: 10/20/19  4:15 AM   Result Value Ref Range    WBC 6.8 4.8 - 10.8 K/uL    RBC 5.36 4.70 - 6.10 M/uL    Hemoglobin 17.1 14.0 - 18.0 g/dL    Hematocrit 48.3 42.0 - 52.0 %    MCV 90.1 81.4 - 97.8 fL    MCH 31.9 27.0 - 33.0 pg    MCHC 35.4 (H) 33.7 - 35.3 g/dL    RDW 39.3 35.9 - 50.0 fL    Platelet Count 182 164 - 446 K/uL    MPV 9.8 9.0 - 12.9 fL    Neutrophils-Polys 61.30 44.00 - 72.00 %    Lymphocytes 25.00 22.00 - 41.00 %    Monocytes 12.60 0.00 - 13.40 %    Eosinophils 0.70 0.00 - 6.90 %    Basophils 0.30 0.00 - 1.80 %    Immature Granulocytes 0.10 0.00 - 0.90 %    Nucleated RBC 0.00 /100 WBC    Neutrophils (Absolute) 4.15 1.82 - 7.42 K/uL    Lymphs (Absolute) 1.69 1.00 - 4.80 K/uL    Monos (Absolute) 0.85 0.00 - 0.85 K/uL    Eos (Absolute) 0.05 0.00 - 0.51 K/uL    Baso (Absolute) 0.02 0.00 - 0.12 K/uL    Immature Granulocytes (abs) 0.01 0.00 - 0.11 K/uL    NRBC (Absolute) 0.00 K/uL   Comp Metabolic Panel     Collection Time: 10/20/19  4:15 AM   Result Value Ref Range    Sodium 138 135 - 145 mmol/L    Potassium 3.7 3.6 - 5.5 mmol/L    Chloride 105 96 - 112 mmol/L    Co2 24 20 - 33 mmol/L    Anion Gap 9.0 0.0 - 11.9    Glucose 91 65 - 99 mg/dL    Bun 13 8 - 22 mg/dL    Creatinine 0.56 0.50 - 1.40 mg/dL    Calcium 8.6 8.5 - 10.5 mg/dL    AST(SGOT) 13 12 - 45 U/L    ALT(SGPT) 9 2 - 50 U/L    Alkaline Phosphatase 40 30 - 99 U/L    Total Bilirubin 1.4 0.1 - 1.5 mg/dL    Albumin 3.6 3.2 - 4.9 g/dL    Total Protein 5.6 (L) 6.0 - 8.2 g/dL    Globulin 2.0 1.9 - 3.5 g/dL    A-G Ratio 1.8 g/dL   ESTIMATED GFR    Collection Time: 10/20/19  4:15 AM   Result Value Ref Range    GFR If African American >60 >60 mL/min/1.73 m 2    GFR If Non African American >60 >60 mL/min/1.73 m 2       Imaging  EC-ECHOCARDIOGRAM COMPLETE W/O CONT         MR-BRAIN-WITH & W/O   Final Result         1.  Moderately large acute area of infarction involving the right hemicerebellum inferiorly and adjacent cerebellar vermis in the distribution of the posterior inferior cerebellar artery.      2.  Age-related cerebral atrophy.      3.  Mild periventricular white matter changes consistent with chronic microvascular ischemic gliosis.      DX-CHEST-2 VIEWS   Final Result      No acute cardiopulmonary abnormality identified.      MR-CERVICAL SPINE-W/O   Final Result         1.  Moderate to severe discal and endplate degenerative changes from the C3-4 level through the C5-6 level with mild marginal osteophytosis.      2.  Minimal to mild multilevel cervical spondylotic change from the C3-4 level through the C6-7 level most pronounced at the C4-5 level.      3.  Moderate to severe multilevel neural foraminal narrowing.      DX-LUMBAR PUNCTURE FOR DIAGNOSIS   Final Result      Fluoroscopic-guided lumbar puncture as described above.      CT-CTA HEAD WITH & W/O-POST PROCESS   Final Result         1.  CT angiogram of the [place] within normal limits.          CT-CTA NECK WITH & W/O-POST PROCESSING   Final Result         1.  4.2 cm ascending thoracic aortic aneurysm, radiographic follow-up and surveillance recommended as clinically appropriate.   2.  Otherwise unremarkable CT angiogram of the neck.      These findings were discussed with the patient's clinician, ANDREIA GIRON, on 10/17/2019 9:37 PM.      DX-CHEST-FOR LINE PLACEMENT Perform procedure in: Patient's Room    (Results Pending)       Assessment/Plan  * CVA (cerebral vascular accident) (HCC)- (present on admission)  Assessment & Plan  Right side cerebellar infarct  Keep sbp < 160  Amlodipine daily, prn medications  No intervention for now per neurosurgery  Hypertonic saline, 150-155 to start  ASA but not dual antiplatelet  Echo  Possible embolic, has hx aortic aneurysm  Pending speech, then will start salt tabs and florinef  PICA distribution  Statin  MRI 10/19    Neck pain- (present on admission)  Assessment & Plan  Secondary to cerebellar stroke    Headache- (present on admission)  Assessment & Plan  Narcotics stopped  Started on gabapentin and fioricet, gabapentin will assist with dizziness  Valproic acid  Improved headache  Secondary to cerebellar ischemia per neurology  250 mg solumedrol once    Essential hypertension- (present on admission)  Assessment & Plan  Amlodipine daily  Prn medications  Goal sbp < 160    Glaucoma- (present on admission)  Assessment & Plan  No acute issues      Discussed patient condition and risk of morbidity and/or mortality with Hospitalist, Family, RN, RT, Pharmacy, , Code status disscussed, Charge nurse / hot rounds, Patient and neurology and neurosurgery.      The patient remains critically ill.  He has acute cerebellar stroke requiring close neurological observation with q 1 hour neuro checks and strict blood pressure control for sbp less than 160. Hypertonic saline for goal 150-155, once at goal relax to 145-155.  I have assessed and reassessed his blood  pressure, hemodynamics, cardiovascular status and neurologic status.  He is at high risk for worsening CNS system dysfunction.  There is high risk of deterioration and worsening vital organ dysfunction and death without the above critical care interventions.  Critical care time = 40 minutes in directly providing and coordinating critical care and extensive data review.  No time overlap and excludes procedures.

## 2019-10-20 NOTE — CONSULTS
DATE OF CONSULTATION: 10/20/2019       CONSULTING PHYSICIAN: Ida Day M.D.     REASON FOR CONSULTATION: stroke      HISTORY OF PRESENT ILLNESS:     I took the history from the chart and if available, whatever I could get from the patient and family.     Jacinta Jauregui is a 69 y.o. man with history of glaucoma and hypertension admitted 10/17/2019 for headache and neck pain.  Patient was in his usual state of health until Thursday prior to admission when he was driving and developed acute onset of a posterior headache associated with neck pain and neck stiffness.  Patient denies any eye pain.  Denies any associated fevers, or chills but did endorse nausea and vomiting.  He also noted dizziness.  He denied any cough, chest pain or shortness of breath.  Due to his symptoms, he asked his wife to drive to the nearest emergency department.  On presentation, he was afebrile without any leukocytosis.  CTA of the head and neck were unremarkable. MRI of the brain now reveals a moderately large acute area of infarction involving the right pepe-cerebellum.  Infectious disease service consulted regarding CSF fluid analysis and antibiotic recommendations.    When I saw him today his main complaint was headache.  Slight suboccipital.  Is a 5 out of 10.  There were no symptoms in the arms or legs.         PAST MEDICAL HISTORY:  has a past medical history of Hypertension.     PAST SURGICAL HISTORY: patient denies any surgical history     ALLERGIES: No Known Allergies     CURRENT MEDICATIONS:   Home Medications     Reviewed by Ashley Squires (Pharmacy Tech) on 10/17/19 at 2301  Med List Status: Complete   Medication Last Dose Status   amLODIPine (NORVASC) 10 MG Tab 10/17/2019 Active   latanoprost (XALATAN) 0.005 % Solution 10/16/2019 Active   timolol (TIMOPTIC) 0.5 % Solution 10/17/2019 Active                FAMILY HISTORY:   Family History   Problem Relation Age of Onset   • No Known Problems Mother    • No Known Problems  "Father         SOCIAL HISTORY:   Social History     Tobacco Use   • Smoking status: Never Smoker   • Smokeless tobacco: Never Used   Substance and Sexual Activity   • Alcohol use: Yes     Frequency: 2-3 times a week   • Drug use: Never   • Sexual activity: Not on file       REVIEW OF SYSTEMS: Comprehensive review of systems is negative with the   exception of the aforementioned HPI, PMH, and PSH elements in accordance with CMS guidelines.     PHYSICAL EXAMINATION:     VITAL SIGNS: BP (!) 164/99   Pulse 60   Temp 37 °C (98.6 °F) (Temporal)   Resp 17   Ht 1.803 m (5' 11\")   Wt 79 kg (174 lb 2.6 oz)   SpO2 95%     GENERAL:    The patient is alert. GCS 15    Awake, alert.  .   Speech fluent appropriate   In no apparent distress   Affect, mood appropriate   Oriented x 3   Pupils 3 mm midline, reactive.  Conjugate gaze.  Visual fields full to confrontationUpward nystagmus   Face symmetric   Tongue midline without fasciculation   Facial sensation intact light touch   Hearing intact to conversation, light finger rub bilaterally   Motor:   he had no weakness in his upper/lower extremities    Sensation:  Intact touch face, neck, torso, four extremities   Reflex:  No Avery's no clonus   Finger-nose-finger, worse on R    LABORATORY VALUES:   Recent Labs     10/18/19  0215 10/19/19  0320 10/20/19  0415   WBC 6.8 9.1 6.8   RBC 5.43 5.33 5.36   HEMOGLOBIN 17.4 16.5 17.1   HEMATOCRIT 49.7 49.2 48.3   MCV 91.5 92.3 90.1   MCH 32.0 31.0 31.9   MCHC 35.0 33.5* 35.4*   RDW 42.7 42.0 39.3   PLATELETCT 220 203 182   MPV 10.9 10.2 9.8     Recent Labs     10/18/19  0215 10/19/19  0320 10/20/19  0415   SODIUM 143 139 138   POTASSIUM 4.3 3.7 3.7   CHLORIDE 104 106 105   CO2 26 23 24   GLUCOSE 120* 96 91   BUN 18 12 13   CREATININE 0.61 0.58 0.56   CALCIUM 9.1 8.5 8.6     Recent Labs     10/18/19  0715   INR 1.09        IMAGING:   MR-BRAIN-WITH & W/O   Final Result         1.  Moderately large acute area of infarction involving the " right hemicerebellum inferiorly and adjacent cerebellar vermis in the distribution of the posterior inferior cerebellar artery.      2.  Age-related cerebral atrophy.      3.  Mild periventricular white matter changes consistent with chronic microvascular ischemic gliosis.      DX-CHEST-2 VIEWS   Final Result      No acute cardiopulmonary abnormality identified.      MR-CERVICAL SPINE-W/O   Final Result         1.  Moderate to severe discal and endplate degenerative changes from the C3-4 level through the C5-6 level with mild marginal osteophytosis.      2.  Minimal to mild multilevel cervical spondylotic change from the C3-4 level through the C6-7 level most pronounced at the C4-5 level.      3.  Moderate to severe multilevel neural foraminal narrowing.      DX-LUMBAR PUNCTURE FOR DIAGNOSIS   Final Result      Fluoroscopic-guided lumbar puncture as described above.      CT-CTA HEAD WITH & W/O-POST PROCESS   Final Result         1.  CT angiogram of the [place] within normal limits.         CT-CTA NECK WITH & W/O-POST PROCESSING   Final Result         1.  4.2 cm ascending thoracic aortic aneurysm, radiographic follow-up and surveillance recommended as clinically appropriate.   2.  Otherwise unremarkable CT angiogram of the neck.      These findings were discussed with the patient's clinician, ANDREIA GIRON, on 10/17/2019 9:37 PM.      EC-ECHOCARDIOGRAM COMPLETE W/O CONT    (Results Pending)       IMPRESSION AND PLAN:     Active Hospital Problems    Diagnosis   • Dizziness [R42]     Priority: High   • Headache [R51]     Priority: High   • Neck pain [M54.2]     Priority: High   • Essential hypertension [I10]     Priority: Medium   • Glaucoma [H40.9]     Priority: Low     Impression    1.  History of suboccipital headache with a right cerebellar infarct-now 3 days out    2.  Local mass-effect only.  No hydrocephalus.    3.  Neurological state okay        Plan    1.  Keep systolic blood pressure less than  160    2.  Neurology should follow closely    3.  Aim for sodium 145-1 55    4.  Repeat CT scan Monday        At this stage is no indication for decompression or an EVD.  We will follow    ____________________________________   Ida Day M.D.      DD: 10/20/2019   DT: 9:17 AM

## 2019-10-20 NOTE — CARE PLAN
Problem: Infection  Goal: Will remain free from infection  Outcome: PROGRESSING AS EXPECTED   Hand hygiene by RN/ other staff    Problem: Neuro Status  Goal: Monitor neuro status and rapid identification of neuro changes  Outcome: PROGRESSING AS EXPECTED   Q1 hour neuro checks

## 2019-10-20 NOTE — PROGRESS NOTES
AM labs drawn and sent to lab. Patient reports feeling the same, feeling weak and fatigued, mild tolerable headache and neck pain. Declines any pain interventions at this time. Comfort measures offered. Vital signs checked and recorded.

## 2019-10-20 NOTE — ASSESSMENT & PLAN NOTE
Right cerebellar infarct in the area of the posterior inferior cerebellar artery presenting 10/17/2019  Started aspirin   High-intensity statin  Echocardiogram negative for clot  Continuous telemetry monitoring to eval for atrial fibrillation  Neurosurgery consulted  Neurology consulted  Blood pressure control  PT/OT/SLP   3% saline drip turned off on 10/23  Cardiology consult today for evaluation of a possible Loop recorder or other method of continuous monitoring for possible atrial fibrillation

## 2019-10-20 NOTE — DISCHARGE PLANNING
[facility] Transitional Care Coordination     Referral from:  Dr. Rucker  Facesheet indicates:  [facility]  Potential Rehab Diagnosis: R cerebellar hemisphere stroke    PT/OT evals pending.  SLP-reg die w/ thin liquids.     D/C support: lives in [place] w/ spouse.      Physiatry consultation Pended per protocol.      Thank you for referral.

## 2019-10-20 NOTE — CONSULTS
"CC:  \"stroke\"    Date of Admission: 10/17/2019    Today's Date: 10/20/19    Requesting  Physician: Tyrone Rucker M.D.              HPI:    Yousif Jauregui is a 69 y.o. male R handed, who was admitted 10/17 with a thunderclap neck pain , headache, and an oblique gaze problem for which he was transferred here .  On admission the patient got a CTA head and neck that was not contributory, and the patient went to Obs for neck pain and headache management.  He has received narcotics with not much help; in addition he documents has not being able to get out of the bed without feeling dizzy.  Patient got MRI brain yesterday that showed a R Cerebellar hemisphere stroke and we were just consulted today .  Because the R cerebellum stroke is more 1/3 of territory, and already compressing the IV ventricle this patient also need it a NSG evaluation, and ICU transfer.          HISTORIES AS ABOVE ARE INCOMPLETE IF PATIENT IS INTUBATED, OR UNABLE TO COMMUNICATE OR ELUCIDATE.    ROS:   Constitutional: No fevers or chills.  Eyes: + Glaucoma R eye after cataract surgery and vision in that eye is not good.  ENT: No dysphagia or hearing loss.  Respiratory: No cough or shortness of breath.  Cardiovascular: No chest pain or palpitations.  GI: No nausea, vomiting, or diarrhea.  : No urinary incontinence or dysuria.  Musculoskeletal: No joint swelling or arthralgias.  Skin: No skin rashes.  Neuro: See HPI.  Endocrine: No heat or cold intolerance. No polydipsia or polyuria.  Psych: No depression or anxiety.  Heme/Lymph: No easy bruising or swollen lymph nodes.  All other systems were reviewed and were negative.     All other systems were reviewed and were negative.      Past Medical History:   Past Medical History:   Diagnosis Date   • Hypertension        Past Surgical History: History reviewed. No pertinent surgical history.    Social History:   Social History     Socioeconomic History   • Marital status:      Spouse name: Not on " file   • Number of children: Not on file   • Years of education: Not on file   • Highest education level: Not on file   Occupational History   • Not on file   Social Needs   • Financial resource strain: Not on file   • Food insecurity:     Worry: Not on file     Inability: Not on file   • Transportation needs:     Medical: Not on file     Non-medical: Not on file   Tobacco Use   • Smoking status: Never Smoker   • Smokeless tobacco: Never Used   Substance and Sexual Activity   • Alcohol use: Yes     Frequency: 2-3 times a week   • Drug use: Never   • Sexual activity: Not on file   Lifestyle   • Physical activity:     Days per week: Not on file     Minutes per session: Not on file   • Stress: Not on file   Relationships   • Social connections:     Talks on phone: Not on file     Gets together: Not on file     Attends Gnosticism service: Not on file     Active member of club or organization: Not on file     Attends meetings of clubs or organizations: Not on file     Relationship status: Not on file   • Intimate partner violence:     Fear of current or ex partner: Not on file     Emotionally abused: Not on file     Physically abused: Not on file     Forced sexual activity: Not on file   Other Topics Concern   • Not on file   Social History Narrative   • Not on file       Family History: no stroke or seizures.  Family History   Problem Relation Age of Onset   • No Known Problems Mother    • No Known Problems Father        Allergies: No Known Allergies      Current Facility-Administered Medications:   •  atorvastatin (LIPITOR) tablet 80 mg, 80 mg, Oral, QHS, Tyrone Rucker M.D.  •  [START ON 10/21/2019] aspirin (ASA) chewable tab 81 mg, 81 mg, Oral, DAILY, Qing Cartagena M.D.  •  valproate (DEPACON) 500 mg in D5W 50 mL IVPB, 500 mg, Intravenous, Q8HRS, Qing Cartagena M.D., Last Rate: 50 mL/hr at 10/20/19 1019, 500 mg at 10/20/19 1019  •  3% sodium chloride (HYPERTONIC SALINE) 500mL infusion 500 mL, 500  mL, Intravenous, Continuous, John Byrne M.D.  •  MD Alert...ICU Electrolyte Replacement per Pharmacy, , Other, PHARMACY TO DOSE, John Byrne M.D.  •  [START ON 10/21/2019] amLODIPine (NORVASC) tablet 10 mg, 10 mg, Oral, Q DAY, John Byrne M.D.  •  enalaprilat (VASOTEC) injection 1.25 mg, 1.25 mg, Intravenous, Q6HRS PRN, Tyrone Rucker M.D.  •  labetalol (NORMODYNE,TRANDATE) injection 10 mg, 10 mg, Intravenous, Q10 MIN PRN, Tyrone Rucker M.D.  •  lidocaine (LIDODERM) 5 % 2 Patch, 2 Patch, Transdermal, Q24HR, Tyrone Rucker M.D., Stopped at 10/19/19 1500  •  latanoprost (XALATAN) 0.005 % ophthalmic solution 2 Drop, 2 Drop, Both Eyes, Nightly, Moose English M.D., 2 Drop at 10/19/19 1932  •  timolol (TIMOPTIC) 0.5 % ophthalmic solution 1 Drop, 1 Drop, Right Eye, QAM, Moose English M.D., 1 Drop at 10/20/19 0620  •  Notify provider if pain remains uncontrolled, , , CONTINUOUS **AND** Use the numeric rating scale (NRS-11) on regular floors and Critical-Care Pain Observation Tool (CPOT) on ICUs/Trauma to assess pain, , , CONTINUOUS **AND** Pulse Ox (Oximetry), , , CONTINUOUS **AND** Pharmacy Consult Request ...Pain Management Review 1 Each, 1 Each, Other, PHARMACY TO DOSE **AND** If patient difficult to arouse and/or has respiratory depression, stop any opiates that are currently infusing and call a Rapid Response., , , CONTINUOUS **AND** [DISCONTINUED] oxyCODONE immediate-release (ROXICODONE) tablet 2.5 mg, 2.5 mg, Oral, Q3HRS PRN, Stopped at 10/19/19 1002 **AND** [DISCONTINUED] oxyCODONE immediate-release (ROXICODONE) tablet 5 mg, 5 mg, Oral, Q3HRS PRN, 5 mg at 10/19/19 1004 **AND** [DISCONTINUED] morphine (pf) 4 MG/ML injection 2 mg, 2 mg, Intravenous, Q3HRS PRN, Moose English M.D.  •  ondansetron (ZOFRAN) syringe/vial injection 4 mg, 4 mg, Intravenous, Q4HRS PRN, Moose English M.D., 4 mg at 10/18/19 1445  •  ondansetron (ZOFRAN ODT) dispertab 4 mg, 4 mg, Oral, Q4HRS PRN, Moose English,  M.D.  •  senna-docusate (PERICOLACE or SENOKOT S) 8.6-50 MG per tablet 2 Tab, 2 Tab, Oral, BID **AND** polyethylene glycol/lytes (MIRALAX) PACKET 1 Packet, 1 Packet, Oral, QDAY PRN **AND** magnesium hydroxide (MILK OF MAGNESIA) suspension 30 mL, 30 mL, Oral, QDAY PRN **AND** bisacodyl (DULCOLAX) suppository 10 mg, 10 mg, Rectal, QDAY PRN, Moose English M.D.      PHYSICAL EXAM    Vitals:    10/20/19 1000 10/20/19 1030 10/20/19 1100 10/20/19 1130   BP: (!) 161/96 158/90 150/90 146/90   Pulse: 62 62 61 71   Resp: 15 13 12 12   Temp: 37.3 °C (99.1 °F)      TempSrc: Temporal      SpO2: 94% 93% 95% 93%   Weight:       Height:           Constitutional: Lying in bed, attempting breakfast , clearly in distress with headaches    Head/Neck: NCAT. no meningismus neg kernig neg brudzinski. No obvious mass or heard bruit. No tender arteries or lost pulses. No rash of head or neck.    Cardiovascular: Regular, rhythmic    Pulmonary: Normal breath sounds    Extremities:  No edema, normal pulses    Skin: Warm, dry, intact. No rashes observed head/neck or body  No stigmata  Callous fingers with darkening from dirt     Eyes/Funduscopic: injected conjuntiva in the R, with increased manual tonometry, pterygium in the R eye, the left eye is his good eye.      Psych: Depressed    Mental Status: Awake, alert, oriented x 3. Name/repeat/fluent/command follows. No neglect/extinction. Attention and concentration, recent & remote memory, Fund of Knowledge wnl     Cranial Nerves: CN II-XII intact. Pupillary reflex + 4 No afferent pupillary defect. EOM full;  VF full; No nystagmus.       Motor:  5/5,      Sensory: symmetric to all modalities.    Coordination: dysmetria in the R hand, with end point tremor.      DTR's: +2 ;  no clonus.    Babinski: neg    Gait/Station: deferred.       NIHSS:     1a: level of conciousness0  1b:month/age1  1c:open eyes/close hand0  2. Best gaze0  3:Visual fields0  4: facial palsy0  5: a motor left arm0  5 b: motor  right arm0  6 a: motor left leg0  6 b : motor right leg0  7: limb ataxia1  8: sensory0  9: best language0  10: dysarthria0  11: extinction /neglect: 0    Total: 1        Labs:  Recent Labs     10/18/19  0215 10/19/19  0320 10/20/19  0415   WBC 6.8 9.1 6.8   RBC 5.43 5.33 5.36   HEMOGLOBIN 17.4 16.5 17.1   HEMATOCRIT 49.7 49.2 48.3   MCV 91.5 92.3 90.1   MCH 32.0 31.0 31.9   MCHC 35.0 33.5* 35.4*   RDW 42.7 42.0 39.3   PLATELETCT 220 203 182   MPV 10.9 10.2 9.8     Recent Labs     10/19/19  0320 10/20/19  0415 10/20/19  1015   SODIUM 139 138 136   POTASSIUM 3.7 3.7 3.7   CHLORIDE 106 105 103   CO2 23 24 24   GLUCOSE 96 91 99   BUN 12 13 12   CREATININE 0.58 0.56 0.55   CALCIUM 8.5 8.6 9.0     Recent Labs     10/18/19  0715   INR 1.09                 Recent Labs     10/19/19  0320 10/20/19  0415 10/20/19  1015   SODIUM 139 138 136   POTASSIUM 3.7 3.7 3.7   CHLORIDE 106 105 103   CO2 23 24 24   GLUCOSE 96 91 99   BUN 12 13 12     Recent Labs     10/19/19  0320 10/20/19  0415 10/20/19  1015   SODIUM 139 138 136   POTASSIUM 3.7 3.7 3.7   CHLORIDE 106 105 103   CO2 23 24 24   BUN 12 13 12   CREATININE 0.58 0.56 0.55   CALCIUM 8.5 8.6 9.0     Recent Labs     10/18/19  0715   INR 1.09     No results found for this or any previous visit.           Imaging: neuroimaging reviewed and directly visualized by me  MR-BRAIN-WITH & W/O   Final Result         1.  Moderately large acute area of infarction involving the right hemicerebellum inferiorly and adjacent cerebellar vermis in the distribution of the posterior inferior cerebellar artery.      2.  Age-related cerebral atrophy.      3.  Mild periventricular white matter changes consistent with chronic microvascular ischemic gliosis.      DX-CHEST-2 VIEWS   Final Result      No acute cardiopulmonary abnormality identified.      MR-CERVICAL SPINE-W/O   Final Result         1.  Moderate to severe discal and endplate degenerative changes from the C3-4 level through the C5-6 level  with mild marginal osteophytosis.      2.  Minimal to mild multilevel cervical spondylotic change from the C3-4 level through the C6-7 level most pronounced at the C4-5 level.      3.  Moderate to severe multilevel neural foraminal narrowing.      DX-LUMBAR PUNCTURE FOR DIAGNOSIS   Final Result      Fluoroscopic-guided lumbar puncture as described above.      CT-CTA HEAD WITH & W/O-POST PROCESS   Final Result         1.  CT angiogram of the [place] within normal limits.         CT-CTA NECK WITH & W/O-POST PROCESSING   Final Result         1.  4.2 cm ascending thoracic aortic aneurysm, radiographic follow-up and surveillance recommended as clinically appropriate.   2.  Otherwise unremarkable CT angiogram of the neck.      These findings were discussed with the patient's clinician, ANDREIA GIRON, on 10/17/2019 9:37 PM.      EC-ECHOCARDIOGRAM COMPLETE W/O CONT    (Results Pending)       Assessment/Plan:    R PICA acute stroke : Day # 3 , with R cerebellum stroke more than 1/3 of territory and already compression IV ventricle   He will need 3% NS for at least 3-4 more days ( past day #5 of stroke), and clinical monitoring to determine neurological deterioration that would warrant an occipital decompression by NSG.  Etiology suspected embolic, and patient has ascending aorta aneurysm which could be culprit? Vs cardioembolic source for which patient needs ECHO with bubble, as well as telemetry until discharge from hospital  MRI brain + CTAs were personally reviewed.    Thundeclap Headache: which is related with posterior ischemia  Recommend : Solumedrol 250 mg IV once.  Ox per cannula.  Fiorciet q 6hr PRN to use only for one week.  Gabapentin to be use also only for one week at dose of 300 mg BID ( as it decrease SHELBY which will impair the other cerebellum to start compensating for his balance)  Depakote also during acute phase of headache.            Antiplatelets: ASA LOAD as not received for the previous day;  will not do DAPT despite good NIH due to stroke size.  Statins: Max therapy  Anticoagulation: Only if A FIB, EF <20%, or IV Thrombus; if OAC is ever started , then antiplatelets will need to be stopped.  DVT: SCD's , Low molecular heparin  Dysphagia screen : bed swallow, or speech     Stroke education, revision of risk factors, and discussion with nursing about my findings were done .    At time of this admission the possible [place] classification is :     ) Large artery atherosclerosis   ) Cardioembolism   ) Small vessel disease   x) Stroke of undetermined etiology         I personally provided 34 minutes of total critical care time outside of time spent on separately billable/documented procedures. Time includes: review of laboratory data, review of radiology studies, discussion with consultants, discussion with family/patient, monitoring for potential decompensation.  Interventions were performed as documented above.   Patient is high risk for death if he does herniates, or develops hydrocephalus.    Qing Cartagena M.D.    Clinical Professor, [employer]  Diplomate, Neurology , Vascular Neurology, Neurophysiology

## 2019-10-20 NOTE — PROGRESS NOTES
Assumed pt care at 0700. Received report from Reena HINSON. A&O x4. Pt denies pain at this time, reports HA present with movement but not present when still. Respirations even and unlabored on 2L nc.   Updated on POC: room transfer, echo; communication board updated. Bed locked and in lowest position. Call light and belongings within reach. Non-skid socks in place. Needs met, will continue to monitor.

## 2019-10-20 NOTE — CARE PLAN
Problem: Pain Management  Goal: Ability to identify factors that increase the pain will improve  Outcome: PROGRESSING AS EXPECTED: Patient reports  pain levels are tolerable, declining most pharmacological interventions.  Interventions: Identify factors that precipitate, worsen or relieve pain or discomfort. Discuss pain control measures and provide pain management interventions. Assess patient's pain level periodically. Implement non-pharmacologic  (rest, relaxation, distraction, decrease environmental stimuli) and pharmacologic pain measures (PRN meds as scheduled)    Problem: Physical Regulation:  Goal: Complications related to the disease process, condition or treatment will be avoided or minimized  Outcome: PROGRESSING AS EXPECTED  Interventions: Assess for complications. Monitor diagnostic results and report accordingly    Problem: Neuro Status  Goal: Monitor neuro status and rapid identification of neuro changes  Outcome: PROGRESSING AS EXPECTED: No significant neuro changes noted  Intervention: Neuro checks Q 4 hours and PRN (see Neuro flowsheet)    Problem: Infection  Goal: Will remain free from infection  Outcome: PROGRESSING AS EXPECTED  Intervention: Assess signs and symptoms of infection (labs, vitals) Educate and encourage infection prevention measures (hand washing, disease specific, and device specific). Assess for potential routes of infection, such as IV.      Problem: Safety  Goal: Free from accidental injury  Outcome: PROGRESSING AS EXPECTED  Intervention: Assess risk factors for fall, Provide assistance with mobility. Implement safety measures and fall precautions (fall risk signs in place, frequent rounding, bed alarms on, belongings within reach, non skid socks in place, call light within reach)     Problem: Psychosocial needs  Goal: Anxiety reduction  Outcome: PROGRESSING AS EXPECTED  Intervention: Stimuli reduction, environmental changes, calming techniques. Encourage verbalization of  concerns and encourage patient participation in care    Problem: Risk for Impaired Mobility  Goal: Mobilize and/or Transfer Safely with [facility] [place]  Outcome: PROGRESSING AS EXPECTED: Pt able to perform basic  ADLs w/o assistance (feeding, toileting), ambulation and transfer w/ minimal to mod assistance  Interventions: Progressive Mobilization--see ADL Flow Sheet. Assist with mobility as necessary. Pain Management adequate to allow progressive mobilization      Problem: Health Behavior:  Goal: Ability to state signs and symptoms to report to health care provider will improve  Outcome: PROGRESSING AS EXPECTED  Intervention: Encourage reporting changes in condition.  Encourage participation in health care plan

## 2019-10-20 NOTE — PROGRESS NOTES
Patient resting/sleeping calmly at this time, easily arousable, IVF infusing well, SR on tele monitor HR:58-60s, on O2 @ 2 lpm w/ mild unlabored breathing. Patient reports sensitivity to light, room lights turned off/darkened and temp adjusted per pt's comfort. Promoted rest and sleep. Call light within reach. Bed in lowest and locked positioned.

## 2019-10-20 NOTE — PROGRESS NOTES
[facility] Medicine Daily Progress Note    Date of Service  10/19/2019    Chief Complaint  Right head and neck pain with weakness, nausea and drowsiness    Hospital Course    69 y.o. male admitted 10/17/2019 with a past medical history of essential hypertension which is well controlled and glaucoma.  Patient presented on 10/17/2019 with sudden onset of pain in the back of his neck in the afternoon while driving.  It resolved, then returned accompanied by visual change.  Patient asked his wife to drive and they went to the emergency department.  At that facility there was concern for subarachnoid hemorrhage and he was transferred to [facility].  In the emergency department he had a normal CTA of the head and neck.      Interval Problem Update  Patient continues to have drowsiness, weakness, nausea and right-sided head and neck pain and photosensitivity.  His family at bedside states he has difficulty balancing when standing up.  He denies visual changes and blurriness.       Code Status  Full    Disposition  To be determined    Review of Systems  Review of Systems   Constitutional: Positive for malaise/fatigue. Negative for chills, diaphoresis and fever.   HENT: Negative for congestion, sinus pain and sore throat.    Eyes: Positive for photophobia. Negative for blurred vision and double vision.   Respiratory: Negative for cough, sputum production, shortness of breath and wheezing.    Cardiovascular: Negative for chest pain, palpitations and leg swelling.   Gastrointestinal: Positive for nausea. Negative for abdominal pain, blood in stool, constipation, diarrhea, heartburn, melena and vomiting.   Musculoskeletal: Positive for neck pain. Negative for back pain, falls, joint pain and myalgias.   Skin: Negative for itching and rash.   Neurological: Positive for weakness and headaches. Negative for dizziness.        Physical Exam  Temp:  [36.8 °C (98.2 °F)-37.5 °C (99.5 °F)] 36.8 °C (98.3 °F)  Pulse:  [62-73] 67  Resp:  [14-18]  14  BP: (146-160)/(81-98) 160/87  SpO2:  [91 %-97 %] 92 %    Physical Exam   Constitutional: He is oriented to person, place, and time. Vital signs are normal. He appears well-developed and well-nourished. He is active and cooperative. No distress.   Drowsy   HENT:   Head: Normocephalic and atraumatic.   Right Ear: External ear normal.   Left Ear: External ear normal.   Nose: Nose normal.   Eyes: Pupils are equal, round, and reactive to light. EOM are normal. Right eye exhibits no discharge. Left eye exhibits no discharge. Right conjunctiva is injected. No scleral icterus.   Neck: Normal range of motion. Neck supple. No JVD present.   Cardiovascular: Normal rate, regular rhythm and normal heart sounds. Exam reveals no gallop and no friction rub.   No murmur heard.  Pulmonary/Chest: Effort normal and breath sounds normal. No respiratory distress. He has no wheezes. He has no rales. He exhibits no tenderness.   Abdominal: Soft. Bowel sounds are normal. He exhibits no distension and no mass. There is no tenderness. There is no rebound and no guarding.   Musculoskeletal: Normal range of motion. He exhibits no edema or tenderness.        Right shoulder: He exhibits normal range of motion and normal strength.   Neurological: He is oriented to person, place, and time. No cranial nerve deficit. Coordination normal.   Skin: Skin is warm, dry and intact. No rash noted. He is not diaphoretic. No erythema. No pallor.   Psychiatric: He has a normal mood and affect. His speech is normal and behavior is normal. Thought content normal.   Nursing note and vitals reviewed.      Fluids    Intake/Output Summary (Last 24 hours) at 10/19/2019 1731  Last data filed at 10/19/2019 1250  Gross per 24 hour   Intake 120 ml   Output 1270 ml   Net -1150 ml       Laboratory  Recent Labs     10/17/19  1935 10/18/19  0215 10/19/19  0320   WBC 10.5 6.8 9.1   RBC 5.66 5.43 5.33   HEMOGLOBIN 17.9 17.4 16.5   HEMATOCRIT 51.5 49.7 49.2   MCV 91.0 91.5  92.3   MCH 31.6 32.0 31.0   MCHC 34.8 35.0 33.5*   RDW 42.7 42.7 42.0   PLATELETCT 222 220 203   MPV 10.8 10.9 10.2     Recent Labs     10/17/19  1935 10/18/19  0215 10/19/19  0320   SODIUM 143 143 139   POTASSIUM 4.7 4.3 3.7   CHLORIDE 105 104 106   CO2 21 26 23   GLUCOSE 111* 120* 96   BUN 21 18 12   CREATININE 0.67 0.61 0.58   CALCIUM 9.3 9.1 8.5     Recent Labs     10/18/19  0715   INR 1.09               Imaging  DX-CHEST-2 VIEWS   Final Result      No acute cardiopulmonary abnormality identified.      MR-CERVICAL SPINE-W/O   Final Result         1.  Moderate to severe discal and endplate degenerative changes from the C3-4 level through the C5-6 level with mild marginal osteophytosis.      2.  Minimal to mild multilevel cervical spondylotic change from the C3-4 level through the C6-7 level most pronounced at the C4-5 level.      3.  Moderate to severe multilevel neural foraminal narrowing.      DX-LUMBAR PUNCTURE FOR DIAGNOSIS   Final Result      Fluoroscopic-guided lumbar puncture as described above.      CT-CTA HEAD WITH & W/O-POST PROCESS   Final Result         1.  CT angiogram of the [place] within normal limits.         CT-CTA NECK WITH & W/O-POST PROCESSING   Final Result         1.  4.2 cm ascending thoracic aortic aneurysm, radiographic follow-up and surveillance recommended as clinically appropriate.   2.  Otherwise unremarkable CT angiogram of the neck.      These findings were discussed with the patient's clinician, ANDREIA GIRON, on 10/17/2019 9:37 PM.      MR-BRAIN-WITH & W/O    (Results Pending)        Assessment/Plan  * Headache- (present on admission)  Assessment & Plan  Resolved with Tylenol  Accompanied by drowsiness and weakness. 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  Hantavirus test pending.  Patient is negative for chills, fever, cough, chest pain.  MRI of the head with and without contrast pending.  Blood cultures pending, negative to  date.      Neck pain- (present on admission)  Assessment & Plan  Worse on palpation  LP within normal limits.  Spinal fluid culture pending.  No change in neurological assessment.  CTA of head and neck negative for acute abnormalities, however showed some degenerative spinal changes at C4-C5 with disc space narrowing.  Cervical spine MRI reviewed by neurosurgery and not suspicious for causation of current symptoms.    Essential hypertension- (present on admission)  Assessment & Plan  Controlled with current medication regimen.  Monitor.   Follow-up with primary care provider.    Glaucoma- (present on admission)  Assessment & Plan  Continue current outpatient regimen.    No new blurriness.       VTE prophylaxis: SCDs

## 2019-10-20 NOTE — THERAPY
"  Speech Language Therapy Clinical Swallow Evaluation completed.  Functional Status: Pt seen on this date for a clinical swallow evaluation. Pt A&Ox4 and agreeable to evaluation. Pt c/o 5/10 headache and preferring to keep lights off and eyes closed for majority of session. Pt c/o nausea and agreed to a few bites of regular/thin liquid breakfast. No overt s/sx appreciated with 2-3 bites of soft solid/solid consistency, 3-4 single sips of thin liquids via cup sip, and whole medications with liquid wash as provided by RN. Vocal quality clear following all trials. Pt declining further PO trials 2/2 nausea. Discussed with Dr. Byrne and he is okay with pt continuing regular/thin liquid diet. Would recommend monitoring during meals to ensure tolerance and please discontinue PO with any overt s/sx of aspiration. SLP to follow.    Recommendations - Diet: minimal PO trials with this clinician 2/2 nausea, however, Dr. Byrne okay with pt continuing regular/thin liquid diet. Recommend monitoring during meals.                          Strategies: Monitor during meals and Head of Bed at 90 Degrees                          Medication Administration:  Whole with liquid wash  Plan of Care: Will benefit from Speech Therapy 3 times per week  Post-Acute Therapy: Recommend outpatient or home health transitional care services for continued speech therapy services      See \"Rehab Therapy-Acute\" Patient Summary Report for complete documentation.   "

## 2019-10-21 LAB
ALBUMIN SERPL BCP-MCNC: 3.7 G/DL (ref 3.2–4.9)
ALBUMIN/GLOB SERPL: 1.9 G/DL
ALP SERPL-CCNC: 42 U/L (ref 30–99)
ALT SERPL-CCNC: 12 U/L (ref 2–50)
ANION GAP SERPL CALC-SCNC: 8 MMOL/L (ref 0–11.9)
ANION GAP SERPL CALC-SCNC: 8 MMOL/L (ref 0–11.9)
ANION GAP SERPL CALC-SCNC: 9 MMOL/L (ref 0–11.9)
AST SERPL-CCNC: 12 U/L (ref 12–45)
BACTERIA CSF CULT: NORMAL
BASOPHILS # BLD AUTO: 0 % (ref 0–1.8)
BASOPHILS # BLD: 0 K/UL (ref 0–0.12)
BILIRUB SERPL-MCNC: 0.9 MG/DL (ref 0.1–1.5)
BUN SERPL-MCNC: 14 MG/DL (ref 8–22)
BUN SERPL-MCNC: 16 MG/DL (ref 8–22)
BUN SERPL-MCNC: 16 MG/DL (ref 8–22)
CALCIUM SERPL-MCNC: 8.3 MG/DL (ref 8.5–10.5)
CALCIUM SERPL-MCNC: 8.3 MG/DL (ref 8.5–10.5)
CALCIUM SERPL-MCNC: 8.4 MG/DL (ref 8.5–10.5)
CHLORIDE SERPL-SCNC: 109 MMOL/L (ref 96–112)
CHLORIDE SERPL-SCNC: 110 MMOL/L (ref 96–112)
CHLORIDE SERPL-SCNC: 113 MMOL/L (ref 96–112)
CHOLEST SERPL-MCNC: 204 MG/DL (ref 100–199)
CO2 SERPL-SCNC: 22 MMOL/L (ref 20–33)
CO2 SERPL-SCNC: 22 MMOL/L (ref 20–33)
CO2 SERPL-SCNC: 24 MMOL/L (ref 20–33)
CREAT SERPL-MCNC: 0.63 MG/DL (ref 0.5–1.4)
CREAT SERPL-MCNC: 0.64 MG/DL (ref 0.5–1.4)
CREAT SERPL-MCNC: 0.64 MG/DL (ref 0.5–1.4)
EOSINOPHIL # BLD AUTO: 0 K/UL (ref 0–0.51)
EOSINOPHIL NFR BLD: 0 % (ref 0–6.9)
ERYTHROCYTE [DISTWIDTH] IN BLOOD BY AUTOMATED COUNT: 38.5 FL (ref 35.9–50)
GLOBULIN SER CALC-MCNC: 1.9 G/DL (ref 1.9–3.5)
GLUCOSE SERPL-MCNC: 101 MG/DL (ref 65–99)
GLUCOSE SERPL-MCNC: 116 MG/DL (ref 65–99)
GLUCOSE SERPL-MCNC: 118 MG/DL (ref 65–99)
GRAM STN SPEC: NORMAL
HCT VFR BLD AUTO: 48.8 % (ref 42–52)
HDLC SERPL-MCNC: 41 MG/DL
HGB BLD-MCNC: 17.3 G/DL (ref 14–18)
IMM GRANULOCYTES # BLD AUTO: 0.03 K/UL (ref 0–0.11)
IMM GRANULOCYTES NFR BLD AUTO: 0.5 % (ref 0–0.9)
LDLC SERPL CALC-MCNC: 150 MG/DL
LYMPHOCYTES # BLD AUTO: 0.78 K/UL (ref 1–4.8)
LYMPHOCYTES NFR BLD: 11.8 % (ref 22–41)
MCH RBC QN AUTO: 31.9 PG (ref 27–33)
MCHC RBC AUTO-ENTMCNC: 35.5 G/DL (ref 33.7–35.3)
MCV RBC AUTO: 90 FL (ref 81.4–97.8)
MONOCYTES # BLD AUTO: 0.15 K/UL (ref 0–0.85)
MONOCYTES NFR BLD AUTO: 2.3 % (ref 0–13.4)
NEUTROPHILS # BLD AUTO: 5.65 K/UL (ref 1.82–7.42)
NEUTROPHILS NFR BLD: 85.4 % (ref 44–72)
NRBC # BLD AUTO: 0 K/UL
NRBC BLD-RTO: 0 /100 WBC
PLATELET # BLD AUTO: 206 K/UL (ref 164–446)
PMV BLD AUTO: 10.1 FL (ref 9–12.9)
POTASSIUM SERPL-SCNC: 3.9 MMOL/L (ref 3.6–5.5)
POTASSIUM SERPL-SCNC: 4 MMOL/L (ref 3.6–5.5)
POTASSIUM SERPL-SCNC: 4.3 MMOL/L (ref 3.6–5.5)
PROT SERPL-MCNC: 5.6 G/DL (ref 6–8.2)
RBC # BLD AUTO: 5.42 M/UL (ref 4.7–6.1)
SIGNIFICANT IND 70042: NORMAL
SITE SITE: NORMAL
SODIUM SERPL-SCNC: 140 MMOL/L (ref 135–145)
SODIUM SERPL-SCNC: 142 MMOL/L (ref 135–145)
SODIUM SERPL-SCNC: 143 MMOL/L (ref 135–145)
SOURCE SOURCE: NORMAL
TRIGL SERPL-MCNC: 63 MG/DL (ref 0–149)
WBC # BLD AUTO: 6.6 K/UL (ref 4.8–10.8)

## 2019-10-21 PROCEDURE — 700105 HCHG RX REV CODE 258: Performed by: INTERNAL MEDICINE

## 2019-10-21 PROCEDURE — 700111 HCHG RX REV CODE 636 W/ 250 OVERRIDE (IP): Performed by: HOSPITALIST

## 2019-10-21 PROCEDURE — 700102 HCHG RX REV CODE 250 W/ 637 OVERRIDE(OP): Performed by: INTERNAL MEDICINE

## 2019-10-21 PROCEDURE — A9270 NON-COVERED ITEM OR SERVICE: HCPCS | Performed by: PSYCHIATRY & NEUROLOGY

## 2019-10-21 PROCEDURE — A9270 NON-COVERED ITEM OR SERVICE: HCPCS | Performed by: INTERNAL MEDICINE

## 2019-10-21 PROCEDURE — 700111 HCHG RX REV CODE 636 W/ 250 OVERRIDE (IP): Performed by: PSYCHIATRY & NEUROLOGY

## 2019-10-21 PROCEDURE — 700105 HCHG RX REV CODE 258: Performed by: HOSPITALIST

## 2019-10-21 PROCEDURE — 80061 LIPID PANEL: CPT

## 2019-10-21 PROCEDURE — 97162 PT EVAL MOD COMPLEX 30 MIN: CPT

## 2019-10-21 PROCEDURE — 85025 COMPLETE CBC W/AUTO DIFF WBC: CPT

## 2019-10-21 PROCEDURE — 97165 OT EVAL LOW COMPLEX 30 MIN: CPT

## 2019-10-21 PROCEDURE — 700102 HCHG RX REV CODE 250 W/ 637 OVERRIDE(OP): Performed by: FAMILY MEDICINE

## 2019-10-21 PROCEDURE — 700102 HCHG RX REV CODE 250 W/ 637 OVERRIDE(OP): Performed by: PSYCHIATRY & NEUROLOGY

## 2019-10-21 PROCEDURE — 99233 SBSQ HOSP IP/OBS HIGH 50: CPT | Performed by: PSYCHIATRY & NEUROLOGY

## 2019-10-21 PROCEDURE — 80053 COMPREHEN METABOLIC PANEL: CPT

## 2019-10-21 PROCEDURE — 99291 CRITICAL CARE FIRST HOUR: CPT | Performed by: INTERNAL MEDICINE

## 2019-10-21 PROCEDURE — 80048 BASIC METABOLIC PNL TOTAL CA: CPT

## 2019-10-21 PROCEDURE — 700101 HCHG RX REV CODE 250: Performed by: HOSPITALIST

## 2019-10-21 PROCEDURE — 770022 HCHG ROOM/CARE - ICU (200)

## 2019-10-21 PROCEDURE — 700111 HCHG RX REV CODE 636 W/ 250 OVERRIDE (IP): Performed by: FAMILY MEDICINE

## 2019-10-21 PROCEDURE — 99233 SBSQ HOSP IP/OBS HIGH 50: CPT | Performed by: HOSPITALIST

## 2019-10-21 PROCEDURE — A9270 NON-COVERED ITEM OR SERVICE: HCPCS | Performed by: FAMILY MEDICINE

## 2019-10-21 PROCEDURE — 700105 HCHG RX REV CODE 258: Performed by: PSYCHIATRY & NEUROLOGY

## 2019-10-21 RX ORDER — ENALAPRILAT 1.25 MG/ML
1.25 INJECTION INTRAVENOUS EVERY 6 HOURS PRN
Status: DISCONTINUED | OUTPATIENT
Start: 2019-10-21 | End: 2019-10-25 | Stop reason: HOSPADM

## 2019-10-21 RX ORDER — POTASSIUM CHLORIDE 20 MEQ/1
40 TABLET, EXTENDED RELEASE ORAL ONCE
Status: COMPLETED | OUTPATIENT
Start: 2019-10-21 | End: 2019-10-21

## 2019-10-21 RX ORDER — LABETALOL HYDROCHLORIDE 5 MG/ML
10 INJECTION, SOLUTION INTRAVENOUS
Status: DISCONTINUED | OUTPATIENT
Start: 2019-10-21 | End: 2019-10-25 | Stop reason: HOSPADM

## 2019-10-21 RX ORDER — SODIUM CHLORIDE 1 G/1
2 TABLET ORAL
Status: DISCONTINUED | OUTPATIENT
Start: 2019-10-21 | End: 2019-10-23

## 2019-10-21 RX ORDER — ASPIRIN 81 MG/1
324 TABLET, CHEWABLE ORAL DAILY
Status: DISCONTINUED | OUTPATIENT
Start: 2019-10-22 | End: 2019-10-25 | Stop reason: HOSPADM

## 2019-10-21 RX ADMIN — ATORVASTATIN CALCIUM 80 MG: 40 TABLET, FILM COATED ORAL at 19:52

## 2019-10-21 RX ADMIN — Medication 2 G: at 12:05

## 2019-10-21 RX ADMIN — SODIUM CHLORIDE 70 ML: 234 INJECTION, SOLUTION INTRAVENOUS at 16:00

## 2019-10-21 RX ADMIN — AMLODIPINE BESYLATE 10 MG: 10 TABLET ORAL at 05:17

## 2019-10-21 RX ADMIN — SODIUM CHLORIDE 500 ML: 234 INJECTION, SOLUTION INTRAVENOUS at 23:13

## 2019-10-21 RX ADMIN — Medication 0.5 G: at 17:28

## 2019-10-21 RX ADMIN — SODIUM CHLORIDE 60 ML: 234 INJECTION, SOLUTION INTRAVENOUS at 08:17

## 2019-10-21 RX ADMIN — ASPIRIN 81 MG 81 MG: 81 TABLET ORAL at 05:17

## 2019-10-21 RX ADMIN — LATANOPROST 2 DROP: 50 SOLUTION OPHTHALMIC at 19:56

## 2019-10-21 RX ADMIN — TIMOLOL MALEATE 1 DROP: 5 SOLUTION OPHTHALMIC at 05:17

## 2019-10-21 RX ADMIN — HYDRALAZINE HYDROCHLORIDE 20 MG: 20 INJECTION INTRAMUSCULAR; INTRAVENOUS at 19:52

## 2019-10-21 RX ADMIN — POTASSIUM CHLORIDE 40 MEQ: 1500 TABLET, EXTENDED RELEASE ORAL at 08:15

## 2019-10-21 RX ADMIN — ENALAPRILAT 1.25 MG: 1.25 INJECTION INTRAVENOUS at 09:30

## 2019-10-21 RX ADMIN — SODIUM CHLORIDE 5 MG/HR: 9 INJECTION, SOLUTION INTRAVENOUS at 20:34

## 2019-10-21 RX ADMIN — VALPROATE SODIUM 500 MG: 100 INJECTION, SOLUTION INTRAVENOUS at 01:03

## 2019-10-21 RX ADMIN — ENALAPRILAT 1.25 MG: 1.25 INJECTION INTRAVENOUS at 18:04

## 2019-10-21 ASSESSMENT — ENCOUNTER SYMPTOMS
CONSTIPATION: 0
DIZZINESS: 1
FOCAL WEAKNESS: 0
WEAKNESS: 1
FEVER: 0
BLURRED VISION: 0
DIZZINESS: 0
SORE THROAT: 0
FALLS: 0
BLOOD IN STOOL: 0
HEARTBURN: 0
VOMITING: 0
SENSORY CHANGE: 0
WHEEZING: 0
PALPITATIONS: 0
NECK PAIN: 1
SHORTNESS OF BREATH: 0
DOUBLE VISION: 0
NAUSEA: 1
COUGH: 0
DIAPHORESIS: 0
CHILLS: 0
DIARRHEA: 0
HEADACHES: 1
MYALGIAS: 0
SPUTUM PRODUCTION: 0
BACK PAIN: 0
STRIDOR: 0
PHOTOPHOBIA: 1
SINUS PAIN: 0
ABDOMINAL PAIN: 0

## 2019-10-21 ASSESSMENT — GAIT ASSESSMENTS
DISTANCE (FEET): 2
ASSISTIVE DEVICE: HAND HELD ASSIST
GAIT LEVEL OF ASSIST: MINIMAL ASSIST

## 2019-10-21 ASSESSMENT — COGNITIVE AND FUNCTIONAL STATUS - GENERAL
DAILY ACTIVITIY SCORE: 21
CLIMB 3 TO 5 STEPS WITH RAILING: TOTAL
MOVING TO AND FROM BED TO CHAIR: A LITTLE
TOILETING: A LITTLE
STANDING UP FROM CHAIR USING ARMS: A LITTLE
MOVING FROM LYING ON BACK TO SITTING ON SIDE OF FLAT BED: A LITTLE
SUGGESTED CMS G CODE MODIFIER MOBILITY: CK
MOBILITY SCORE: 16
SUGGESTED CMS G CODE MODIFIER DAILY ACTIVITY: CJ
DRESSING REGULAR LOWER BODY CLOTHING: A LITTLE
HELP NEEDED FOR BATHING: A LITTLE
WALKING IN HOSPITAL ROOM: A LOT

## 2019-10-21 ASSESSMENT — ACTIVITIES OF DAILY LIVING (ADL): TOILETING: INDEPENDENT

## 2019-10-21 NOTE — CARE PLAN
Problem: Communication  Goal: The ability to communicate needs accurately and effectively will improve  Outcome: PROGRESSING AS EXPECTED  Intervention: Educate patient and significant other/support system about the plan of care, procedures, treatments, medications and allow for questions  Flowsheets (Taken 10/20/2019 1958)  Pt & Family Have Been Educated on Methods Available to Report Concerns Related to Care, Treatment, Services, and Patient Safety Issues: Yes     Problem: Safety  Goal: Will remain free from falls  Outcome: PROGRESSING AS EXPECTED  Intervention: Implement fall precautions  Flowsheets  Taken 10/20/2019 1958 by Kelly Lord, R.NSilas  Bed Alarm: Yes - Alarm On  Taken 10/20/2019 1800 by Kiera Olea RCHEMO  Environmental Precautions: Bed in Low Position;Mobility Assessed & Appropriate Sign Placed;Communication Sign for Patients & Families;Report Given to Other Health Care Providers Regarding Fall Risk;Personal Belongings, Wastebasket, Call Bell etc. in Easy Reach;Treaded Slipper Socks on Patient     Problem: Infection  Goal: Will remain free from infection  Outcome: PROGRESSING AS EXPECTED     Problem: Safety  Goal: Free from accidental injury  Outcome: PROGRESSING AS EXPECTED  Intervention: Initiate Safety Measures  Note:   Bed alarm on, call light within reach, room close to nursing station, adequate lighting provided     Problem: Neuro Status  Goal: Monitor neuro status and rapid identification of neuro changes  Outcome: PROGRESSING AS EXPECTED

## 2019-10-21 NOTE — DISCHARGE PLANNING
Transitonal Care Coordination.  Rehab follow up    CT of head w/o - Low density changes in the R cerebellar hemisphere, appearance compatible w/ evolving infarct.  PT/OT ordered.  After evaluations, will review and determine if pt meets protocol to refer to Physiatry.      Will continue to follow.

## 2019-10-21 NOTE — THERAPY
"Occupational Therapy Evaluation completed.   Functional Status: OT eval completed on 70 YO M admitted with R cerebellar hemisphere stroke. Pt reported nausea and dizziness while seated EOB which limited pt's ability to participate in standing functional tasks and func mobs around the room. Pt demonstrated WFL for BUE AROM and strength. Pt min A for sit>stand with HHA. Pt demonstrated self-feeding with use of L hand with setup. Pt's BP monitored throughout session. Pt 160/95 after a few minutes of sitting at EOB then 162/93. Pt returned back to supine. Pt may benefit from short inpatient transitional care stay prior to d/c home to increase independence with BADLs and functional transfers. Will continue to follow for acute OT services while in-house.   Plan of Care: Will benefit from Occupational Therapy 5 times per week  Discharge Recommendations:  Equipment: Will Continue to Assess for Equipment Needs. Recommend post-acute placement for additional occupational therapy services prior to discharge home. Patient can tolerate post-acute therapies at a 5x/week frequency.    See \"Rehab Therapy-Acute\" Patient Summary Report for complete documentation.    "

## 2019-10-21 NOTE — PROGRESS NOTES
[facility] Daily Progress Note    Date of Service  10/21/2019    Chief Complaint  Right head and neck pain with weakness, nausea and drowsiness    Hospital Course    69 y.o. male admitted 10/17/2019 with a past medical history of essential hypertension which is well controlled and glaucoma.  Patient presented on 10/17/2019 with sudden onset of pain in the back of his neck in the afternoon while driving.  It resolved, then returned accompanied by visual change.  Patient asked his wife to drive and they went to the emergency department.  At that facility there was concern for subarachnoid hemorrhage and he was transferred to [facility].  In the emergency department he had a normal CTA of the head and neck.      Interval Problem Update  Patient seen and examined today. ICU Care  Care and plan discussed in IDT/Hot rounds.  Lines and assistive devices reviewed.    Patient tolerating treatment and therapies.  All Data, Medication data reviewed.  Case discussed with nursing as available.  Plan of Care reviewed with patient and notified of changes.  10/21 patient found with significant right cerebella infarction, significant edema with possible compression of the fourth ventricle.  Transferred to ICU, discussed with neurosurgery and neurology.  No need for surgical intervention.  Patient feels improved, less posterior headache, heart rate in 50s to 70s, blood pressure 120-140.  Afebrile, follow-up CT head shows continued low-density changes otherwise no acute abnormalities or hemorrhage.  Code Status  Full    Disposition  ICU    Review of Systems  Review of Systems   Constitutional: Positive for malaise/fatigue. Negative for chills, diaphoresis and fever.   HENT: Negative for congestion, sinus pain and sore throat.    Eyes: Positive for photophobia. Negative for blurred vision and double vision.   Respiratory: Negative for cough, sputum production, shortness of breath and wheezing.    Cardiovascular: Negative for chest  pain, palpitations and leg swelling.   Gastrointestinal: Positive for nausea. Negative for abdominal pain, blood in stool, constipation, diarrhea, heartburn, melena and vomiting.   Musculoskeletal: Positive for neck pain. Negative for back pain, falls, joint pain and myalgias.   Skin: Negative for itching and rash.   Neurological: Positive for weakness and headaches. Negative for dizziness.        Physical Exam  Temp:  [36.9 °C (98.5 °F)-37.4 °C (99.4 °F)] 37 °C (98.6 °F)  Pulse:  [57-82] 73  Resp:  [12-18] 15  BP: (118-164)/() 137/73  SpO2:  [91 %-97 %] 94 %    Physical Exam   Constitutional: He is oriented to person, place, and time. Vital signs are normal. He appears well-developed and well-nourished. He is active and cooperative. No distress.   Drowsy   HENT:   Head: Normocephalic and atraumatic.   Right Ear: External ear normal.   Left Ear: External ear normal.   Nose: Nose normal.   Eyes: Pupils are equal, round, and reactive to light. EOM are normal. Right eye exhibits no discharge. Left eye exhibits no discharge. Right conjunctiva is injected. No scleral icterus.   Neck: Normal range of motion. Neck supple. No JVD present.   Cardiovascular: Normal rate, regular rhythm and normal heart sounds. Exam reveals no gallop and no friction rub.   No murmur heard.  Pulmonary/Chest: Effort normal and breath sounds normal. No respiratory distress. He has no wheezes. He has no rales. He exhibits no tenderness.   Abdominal: Soft. Bowel sounds are normal. He exhibits no distension and no mass. There is no tenderness. There is no rebound and no guarding.   Musculoskeletal: Normal range of motion. He exhibits no edema or tenderness.        Right shoulder: He exhibits normal range of motion and normal strength.   Neurological: He is oriented to person, place, and time. No cranial nerve deficit. Coordination normal.   Skin: Skin is warm, dry and intact. No rash noted. He is not diaphoretic. No erythema. No pallor.    Psychiatric: He has a normal mood and affect. His speech is normal and behavior is normal. Thought content normal.   Nursing note and vitals reviewed.      Fluids    Intake/Output Summary (Last 24 hours) at 10/21/2019 0756  Last data filed at 10/21/2019 0600  Gross per 24 hour   Intake 1471.59 ml   Output 1600 ml   Net -128.41 ml       Laboratory  Recent Labs     10/19/19  0320 10/20/19  0415 10/21/19  0525   WBC 9.1 6.8 6.6   RBC 5.33 5.36 5.42   HEMOGLOBIN 16.5 17.1 17.3   HEMATOCRIT 49.2 48.3 48.8   MCV 92.3 90.1 90.0   MCH 31.0 31.9 31.9   MCHC 33.5* 35.4* 35.5*   RDW 42.0 39.3 38.5   PLATELETCT 203 182 206   MPV 10.2 9.8 10.1     Recent Labs     10/20/19  1623 10/20/19  2305 10/21/19  0525   SODIUM 137 139 142   POTASSIUM 3.7 3.7 3.9   CHLORIDE 104 107 109   CO2 25 23 24   GLUCOSE 95 173* 116*   BUN 14 17 16   CREATININE 0.66 0.66 0.64   CALCIUM 8.6 8.8 8.4*             Recent Labs     10/21/19  0525   TRIGLYCERIDE 63   HDL 41   *       Imaging  CT-HEAD W/O   Final Result         1.  Low-density changes in the right cerebellar hemisphere, appearance compatible with evolving infarct.   2.  Otherwise nonspecific white matter changes, commonly associated with small vessel ischemic disease.  Associated mild cerebral atrophy is noted.   3.  Left maxillary sinusitis changes   4.  Atherosclerosis.      EC-ECHOCARDIOGRAM COMPLETE W/O CONT   Final Result      DX-CHEST-FOR LINE PLACEMENT Perform procedure in: Patient's Room   Final Result      Right IJ central catheter is well-positioned. No pneumothorax.      MR-BRAIN-WITH & W/O   Final Result         1.  Moderately large acute area of infarction involving the right hemicerebellum inferiorly and adjacent cerebellar vermis in the distribution of the posterior inferior cerebellar artery.      2.  Age-related cerebral atrophy.      3.  Mild periventricular white matter changes consistent with chronic microvascular ischemic gliosis.      DX-CHEST-2 VIEWS   Final  Result      No acute cardiopulmonary abnormality identified.      MR-CERVICAL SPINE-W/O   Final Result         1.  Moderate to severe discal and endplate degenerative changes from the C3-4 level through the C5-6 level with mild marginal osteophytosis.      2.  Minimal to mild multilevel cervical spondylotic change from the C3-4 level through the C6-7 level most pronounced at the C4-5 level.      3.  Moderate to severe multilevel neural foraminal narrowing.      DX-LUMBAR PUNCTURE FOR DIAGNOSIS   Final Result      Fluoroscopic-guided lumbar puncture as described above.      CT-CTA HEAD WITH & W/O-POST PROCESS   Final Result         1.  CT angiogram of the [place] within normal limits.         CT-CTA NECK WITH & W/O-POST PROCESSING   Final Result         1.  4.2 cm ascending thoracic aortic aneurysm, radiographic follow-up and surveillance recommended as clinically appropriate.   2.  Otherwise unremarkable CT angiogram of the neck.      These findings were discussed with the patient's clinician, ANDREIA GIRON, on 10/17/2019 9:37 PM.           Assessment/Plan  * CVA (cerebral vascular accident) (HCC)- (present on admission)  Assessment & Plan  Right cerebellar infarct in the area of the posterior inferior cerebellar artery presenting 10/17/2019  Started aspirin, Lipitor.   Echocardiogram benign  No neurosurgical intervention indicated  Neurology following  PT/OT/SLP     Neck pain- (present on admission)  Assessment & Plan  Secondary to cerebral infarct, follow    Headache- (present on admission)  Assessment & Plan  Secondary to CVA, symptomatic treatment    Essential hypertension- (present on admission)  Assessment & Plan  Keep systolic blood pressure less than 140 as per neurology  PRN antihypertensives.    Glaucoma- (present on admission)  Assessment & Plan  Continue current outpatient regimen.    No new blurriness.     Plan  Continue with current treatment for acute stroke of the cerebellum  Consider  continuing treatment for hypertonic status  Pressure control to blood pressure less than 140  Close neuro checks and follow-up  Lab continue to follow-up  Medically complex high risk  See orders    VTE prophylaxis: SCDs    I have performed a physical exam and reviewed and updated ROS and Plan today . In review of yesterday's note , there are no changes except as documented above.

## 2019-10-21 NOTE — CARE PLAN
Problem: Knowledge Deficit  Goal: Knowledge of disease process/condition, treatment plan, diagnostic tests, and medications will improve  Outcome: PROGRESSING AS EXPECTED  Note:   Stroke packet given to pt. Discussed s/s of stroke and treatment plan. Verbalized understanding.     Problem: Risk for Impaired Mobility  Goal: Mobilize and/or Transfer Safely with [place]  Outcome: PROGRESSING SLOWER THAN EXPECTED  Note:   Pt able to sit at EOB and stand w/ staff assist. Pt c/o nausea and dizziness. SBP 160s, PRN enalapril given. Refusing zofran.

## 2019-10-21 NOTE — PROGRESS NOTES
Paged Dr. Schumacher to clarify orders regarding 3% gtt. Na 139. Orders received to keep the rate at 60 mL/hr. Refer to MAR.

## 2019-10-21 NOTE — THERAPY
"Physical Therapy Evaluation completed.   Bed Mobility: Minimal Assist (HHA for leverage)     Transfers: Pt declined   Gait: Minimal Assist with HHA       Plan of Care: Will benefit from Physical Therapy 5 times per week  Discharge Recommendations: Equipment: Will Continue to Assess for Equipment Needs.     Pt admitted for CVA workup and presents most limited by nausea and dizziness during mobility. He required min A for stability during bed mob and held himself upright in midline while seated EOB. Pt initiated sit to stand with minimal A and was able to take lateral steps with poor control of L foot during placement. BP elevated throughout eventually reaching 162/93mmHg; RN administered BP meds and pt was returned to supine. While here, PT will follow for impaired LE coordination, poor activity tolerance, and impaired motor control/sequencing. May require placement pending his progression.      See \"Rehab Therapy-Acute\" Patient Summary Report for complete documentation.     "

## 2019-10-21 NOTE — PROGRESS NOTES
"Critical Care Progress Note    Date of admission  10/17/2019    Chief Complaint  69 y.o. male admitted 10/17/2019 with CVA    Hospital Course    \"69 y.o. male who presented 10/17/2019 with headache.  CTA head neck normal.  MRI neck cervical degeneration, no acute intervention per NS.  MRI 10/19 showed right cerebellar infarction with some compression 4th ventricle.  Discussed with NS, no intervention.  Discussed with neurology, hypertonic saline.  SBP less than 160 per neurosurgery.  Patient is alert and oriented.  Headache on arrival ongoing for several days.  Says more dizziness and balance issues rather than focal weakness..  Right side dysmetria.\"      Interval Problem Update  Reviewed last 24 hour events:   - No acute events overnight   - Neuro: NIHSS 0, Right sided coordination deficits   - HR: 50s-70s   - SBP: 120s-140s   - GI: tolerating diet   - UOP: 1.6L since admit   - Sepulveda: no   - Tm: 37.4   - Lines: CVC, PIV   - PPx: GI not indicated, DVT SCDs   - 2L NC   - CXR (personally reviewed and compared to prior): *no new    Review of Systems  Review of Systems   Constitutional: Negative for chills and fever.   Respiratory: Negative for cough, sputum production, shortness of breath and stridor.    Cardiovascular: Negative for chest pain.   Gastrointestinal: Positive for nausea. Negative for abdominal pain and vomiting.   Genitourinary: Negative for dysuria.   Musculoskeletal: Negative for myalgias.   Skin: Negative for rash.   Neurological: Positive for dizziness and headaches. Negative for sensory change and focal weakness.        Vital Signs for last 24 hours   Temp:  [36.9 °C (98.5 °F)-37.4 °C (99.4 °F)] 37 °C (98.6 °F)  Pulse:  [57-82] 73  Resp:  [12-18] 15  BP: (118-164)/() 137/73  SpO2:  [91 %-97 %] 94 %    Hemodynamic parameters for last 24 hours       Respiratory Information for the last 24 hours       Physical Exam   Physical Exam   Constitutional: He is oriented to person, place, and time. He " appears well-developed and well-nourished.   HENT:   Right Ear: External ear normal.   Left Ear: External ear normal.   Mouth/Throat: Oropharynx is clear and moist.   Eyes: Pupils are equal, round, and reactive to light. Conjunctivae and EOM are normal. No scleral icterus.   Neck: Neck supple. No JVD present. No tracheal deviation present.   Cardiovascular: Normal rate, regular rhythm and intact distal pulses.   Pulmonary/Chest: Effort normal and breath sounds normal. No respiratory distress.   Abdominal: Soft. Bowel sounds are normal. He exhibits no distension.   Musculoskeletal: Normal range of motion. He exhibits no edema or tenderness.   Neurological: He is alert and oriented to person, place, and time. No cranial nerve deficit. He exhibits normal muscle tone. Coordination (right sided past-pointing on FTN) abnormal.   Skin: Skin is warm and dry.   Psychiatric: He has a normal mood and affect.   Nursing note and vitals reviewed.      Medications  Current Facility-Administered Medications   Medication Dose Route Frequency Provider Last Rate Last Dose   • potassium chloride SA (Kdur) tablet 40 mEq  40 mEq Oral Once Gautam Barragan Jr., D.O.       • atorvastatin (LIPITOR) tablet 80 mg  80 mg Oral QHS Tyrone Rucker M.D.   80 mg at 10/20/19 2050   • aspirin (ASA) chewable tab 81 mg  81 mg Oral DAILY Qing Cartagena M.D.   81 mg at 10/21/19 0517   • valproate (DEPACON) 500 mg in D5W 50 mL IVPB  500 mg Intravenous Q8HRS Qing Cartagena M.D.   Stopped at 10/21/19 0203   • 3% sodium chloride (HYPERTONIC SALINE) 500mL infusion 500 mL  500 mL Intravenous Continuous John Byrne M.D. 60 mL/hr at 10/21/19 0700     • MD Alert...ICU Electrolyte Replacement per Pharmacy   Other PHARMACY TO DOSE John Byrne M.D.       • amLODIPine (NORVASC) tablet 10 mg  10 mg Oral Q DAY John Byrne M.D.   10 mg at 10/21/19 0517   • enalaprilat (VASOTEC) injection 1.25 mg  1.25 mg Intravenous Q6HRS HALLIEN Tyrone  JERRICA Rucker       • labetalol (NORMODYNE,TRANDATE) injection 10 mg  10 mg Intravenous Q10 MIN PRN Tyrone Rucker M.D.       • niCARdipine (CARDENE) 25 mg in  mL Infusion  0-15 mg/hr Intravenous Continuous John Byrne M.D.   Stopped at 10/20/19 1800   • hydrALAZINE (APRESOLINE) injection 10-20 mg  10-20 mg Intravenous Q6HRS PRN John Byrne M.D.   20 mg at 10/20/19 1758   • lidocaine (LIDODERM) 5 % 2 Patch  2 Patch Transdermal Q24HR Tyrone Rucker M.D.   Stopped at 10/19/19 1500   • latanoprost (XALATAN) 0.005 % ophthalmic solution 2 Drop  2 Drop Both Eyes Nightly Moose English M.D.   2 Drop at 10/20/19 2050   • timolol (TIMOPTIC) 0.5 % ophthalmic solution 1 Drop  1 Drop Right Eye QAM Moose English M.D.   1 Drop at 10/21/19 0517   • Pharmacy Consult Request ...Pain Management Review 1 Each  1 Each Other PHARMACY TO DOSE Moose English M.D.       • ondansetron (ZOFRAN) syringe/vial injection 4 mg  4 mg Intravenous Q4HRS PRN Moose English M.D.   4 mg at 10/18/19 1445   • ondansetron (ZOFRAN ODT) dispertab 4 mg  4 mg Oral Q4HRS PRN Moose English M.D.       • senna-docusate (PERICOLACE or SENOKOT S) 8.6-50 MG per tablet 2 Tab  2 Tab Oral BID Moose English M.D.        And   • polyethylene glycol/lytes (MIRALAX) PACKET 1 Packet  1 Packet Oral QDAY PRN Moose English M.D.        And   • magnesium hydroxide (MILK OF MAGNESIA) suspension 30 mL  30 mL Oral QDAY PRN Moose English M.D.        And   • bisacodyl (DULCOLAX) suppository 10 mg  10 mg Rectal QDAY PRN Moose English, M.D.           Fluids    Intake/Output Summary (Last 24 hours) at 10/21/2019 0712  Last data filed at 10/21/2019 0600  Gross per 24 hour   Intake 1471.59 ml   Output 1600 ml   Net -128.41 ml       Laboratory          Recent Labs     10/20/19  1623 10/20/19  2305 10/21/19  0525   SODIUM 137 139 142   POTASSIUM 3.7 3.7 3.9   CHLORIDE 104 107 109   CO2 25 23 24   BUN 14 17 16   CREATININE 0.66 0.66 0.64   CALCIUM 8.6 8.8 8.4*      Recent Labs     10/19/19  0320 10/20/19  0415  10/20/19  1623 10/20/19  2305 10/21/19  0525   ALTSGPT 11 9  --   --   --  12   ASTSGOT 16 13  --   --   --  12   ALKPHOSPHAT 43 40  --   --   --  42   TBILIRUBIN 1.2 1.4  --   --   --  0.9   GLUCOSE 96 91   < > 95 173* 116*    < > = values in this interval not displayed.     Recent Labs     10/19/19  0320 10/20/19  0415 10/21/19  0525   WBC 9.1 6.8 6.6   NEUTSPOLYS 66.20 61.30 85.40*   LYMPHOCYTES 21.80* 25.00 11.80*   MONOCYTES 11.30 12.60 2.30   EOSINOPHILS 0.20 0.70 0.00   BASOPHILS 0.30 0.30 0.00   ASTSGOT 16 13 12   ALTSGPT 11 9 12   ALKPHOSPHAT 43 40 42   TBILIRUBIN 1.2 1.4 0.9     Recent Labs     10/18/19  0715 10/19/19  0320 10/20/19  0415 10/21/19  0525   RBC  --  5.33 5.36 5.42   HEMOGLOBIN  --  16.5 17.1 17.3   HEMATOCRIT  --  49.2 48.3 48.8   PLATELETCT  --  203 182 206   PROTHROMBTM 14.3  --   --   --    INR 1.09  --   --   --        Imaging  X-Ray:  I have personally reviewed the images and compared with prior images.  CT:    Reviewed    Assessment/Plan  * CVA (cerebral vascular accident) (HCC)- (present on admission)  Assessment & Plan  Right side cerebellar infarct  Keep sbp <160 mmHg  Amlodipine 10 mg daily, prn medications  No intervention for now per neurosurgery  Hypertonic saline, maintain normonatremia  ASA but not dual antiplatelet  Echo  Possible embolic, has hx aortic aneurysm  Start NaCl tabs, wean 3%  PICA distribution  Statin  MRI 10/19  Neuro and Neurosx on board    Dizziness- (present on admission)  Assessment & Plan  2/2 cerebellar CVA    Neck pain- (present on admission)  Assessment & Plan  Secondary to cerebellar stroke    Headache- (present on admission)  Assessment & Plan  Narcotics off  Started on gabapentin and fioricet, gabapentin will assist with dizziness  Transition off Valproic acid  Improved  Secondary to cerebellar ischemia per neurology    Essential hypertension- (present on admission)  Assessment & Plan  Amlodipine  daily  Prn medications  Goal sbp <160 mmHg    Glaucoma- (present on admission)  Assessment & Plan  No acute issues       VTE:  SCDs  Ulcer: Not Indicated  Lines: Central Line  Ongoing indication addressed    I have performed a physical exam and reviewed and updated ROS and Plan today (10/21/2019). In review of yesterday's note (10/20/2019), there are no changes except as documented above.     Titrating 3%, frequent neuro exams. This patient is critically ill, at high risk for decompensation leading to worsening vital organ dysfunction and death without critical care interventions.    Discussed patient condition and risk of morbidity and/or mortality with Hospitalist, RN, RT, Pharmacy, Dietary, , Code status disscussed, Charge nurse / hot rounds, Patient and neurology and neurosurgery     The patient remains critically ill.  Critical care time = 32 minutes in directly providing and coordinating critical care and extensive data review.  No time overlap and excludes procedures.

## 2019-10-21 NOTE — PROGRESS NOTES
"Neurosurgery  No changes overnight  He denies ha/n/v or confusion    CT overnight shows evolving infarct      Objective:  VSS  LS Na 142  A&O X4  PERRL  No drift  CN II-XII grossly intact        /73   Pulse 73   Temp 37 °C (98.6 °F) (Temporal)   Resp 15   Ht 1.803 m (5' 11\")   Wt 78.2 kg (172 lb 6.4 oz)   SpO2 94%     Intake/Output Summary (Last 24 hours) at 10/21/2019 0758  Last data filed at 10/21/2019 0600  Gross per 24 hour   Intake 1471.59 ml   Output 1600 ml   Net -128.41 ml       Recent Labs     10/19/19  0320 10/20/19  0415 10/21/19  0525   WBC 9.1 6.8 6.6   RBC 5.33 5.36 5.42   HEMOGLOBIN 16.5 17.1 17.3   HEMATOCRIT 49.2 48.3 48.8   MCV 92.3 90.1 90.0   MCH 31.0 31.9 31.9   MCHC 33.5* 35.4* 35.5*   RDW 42.0 39.3 38.5   PLATELETCT 203 182 206   MPV 10.2 9.8 10.1     Recent Labs     10/20/19  1623 10/20/19  2305 10/21/19  0525   SODIUM 137 139 142   POTASSIUM 3.7 3.7 3.9   CHLORIDE 104 107 109   CO2 25 23 24   GLUCOSE 95 173* 116*   BUN 14 17 16           CT-HEAD W/O   Final Result         1.  Low-density changes in the right cerebellar hemisphere, appearance compatible with evolving infarct.   2.  Otherwise nonspecific white matter changes, commonly associated with small vessel ischemic disease.  Associated mild cerebral atrophy is noted.   3.  Left maxillary sinusitis changes   4.  Atherosclerosis.      EC-ECHOCARDIOGRAM COMPLETE W/O CONT   Final Result      DX-CHEST-FOR LINE PLACEMENT Perform procedure in: Patient's Room   Final Result      Right IJ central catheter is well-positioned. No pneumothorax.      MR-BRAIN-WITH & W/O   Final Result         1.  Moderately large acute area of infarction involving the right hemicerebellum inferiorly and adjacent cerebellar vermis in the distribution of the posterior inferior cerebellar artery.      2.  Age-related cerebral atrophy.      3.  Mild periventricular white matter changes consistent with chronic microvascular ischemic gliosis.      DX-CHEST-2 " VIEWS   Final Result      No acute cardiopulmonary abnormality identified.      MR-CERVICAL SPINE-W/O   Final Result         1.  Moderate to severe discal and endplate degenerative changes from the C3-4 level through the C5-6 level with mild marginal osteophytosis.      2.  Minimal to mild multilevel cervical spondylotic change from the C3-4 level through the C6-7 level most pronounced at the C4-5 level.      3.  Moderate to severe multilevel neural foraminal narrowing.      DX-LUMBAR PUNCTURE FOR DIAGNOSIS   Final Result      Fluoroscopic-guided lumbar puncture as described above.      CT-CTA HEAD WITH & W/O-POST PROCESS   Final Result         1.  CT angiogram of the [place] within normal limits.         CT-CTA NECK WITH & W/O-POST PROCESSING   Final Result         1.  4.2 cm ascending thoracic aortic aneurysm, radiographic follow-up and surveillance recommended as clinically appropriate.   2.  Otherwise unremarkable CT angiogram of the neck.      These findings were discussed with the patient's clinician, ANDREIA GIRON, on 10/17/2019 9:37 PM.            Assessment:  Active Hospital Problems    Diagnosis   • CVA (cerebral vascular accident) (HCC) [I63.9]     Priority: High   • Headache [R51]     Priority: High   • Neck pain [M54.2]     Priority: High   • Essential hypertension [I10]     Priority: Medium   • Glaucoma [H40.9]     Priority: Low       HD#2  Impression     1.  History of suboccipital headache with a right cerebellar infarct-now 3 days out     2.  Local mass-effect only.  No hydrocephalus.     3.  Neurological state okay        Plan:     1.  Keep systolic blood pressure less than 160     2.  Aim for sodium 145-155    No surgery or EVD planned at this time

## 2019-10-21 NOTE — PROGRESS NOTES
Neurology Progress Note  Neurohospitalist Service, [facility]    Referring Physician: Tyrone Rucker M.D.    Chief Complaint   Patient presents with   • Headache     Sudden onset of 10/10 posterior head pain. No trauma. Dizziness, nausea. Transfered for CT.       HPI: Refer to initial documented Neurology H&P, as detailed in the patient's chart by Dr. Nguyen 10/20/19.    Interval History 10/21/19: The patient continues to complain of headache and neck pain.  Remains in ICU level care.    Review of systems: In addition to what is detailed in the HPI and/or updated in the interval history, all other systems reviewed and are negative.    Past Medical History:    has a past medical history of Hypertension.    FHx:  family history includes No Known Problems in his father and mother.    SHx:   reports that he has never smoked. He has never used smokeless tobacco. He reports that he drinks alcohol. He reports that he does not use drugs.    Medications:    Current Facility-Administered Medications:   •  potassium chloride SA (Kdur) tablet 40 mEq, 40 mEq, Oral, Once, Gautam Barragan Jr., D.O.  •  atorvastatin (LIPITOR) tablet 80 mg, 80 mg, Oral, QHS, Tyrone Rucker M.D., 80 mg at 10/20/19 2050  •  aspirin (ASA) chewable tab 81 mg, 81 mg, Oral, DAILY, Qing Cartagena M.D., 81 mg at 10/21/19 0517  •  valproate (DEPACON) 500 mg in D5W 50 mL IVPB, 500 mg, Intravenous, Q8HRS, Qing Cartagena M.D., Stopped at 10/21/19 0203  •  3% sodium chloride (HYPERTONIC SALINE) 500mL infusion 500 mL, 500 mL, Intravenous, Continuous, John Byrne M.D., Last Rate: 60 mL/hr at 10/21/19 0700  •  MD Alert...ICU Electrolyte Replacement per Pharmacy, , Other, PHARMACY TO DOSE, John Bynre M.D.  •  amLODIPine (NORVASC) tablet 10 mg, 10 mg, Oral, Q DAY, John Byrne M.D., 10 mg at 10/21/19 0517  •  enalaprilat (VASOTEC) injection 1.25 mg, 1.25 mg, Intravenous, Q6HRS PRN, Tyrone Rucker M.D.  •   labetalol (NORMODYNE,TRANDATE) injection 10 mg, 10 mg, Intravenous, Q10 MIN PRN, Tyrone Rucker M.D.  •  niCARdipine (CARDENE) 25 mg in  mL Infusion, 0-15 mg/hr, Intravenous, Continuous, John Byrne M.D., Stopped at 10/20/19 1800  •  hydrALAZINE (APRESOLINE) injection 10-20 mg, 10-20 mg, Intravenous, Q6HRS PRN, John Byrne M.D., 20 mg at 10/20/19 1758  •  lidocaine (LIDODERM) 5 % 2 Patch, 2 Patch, Transdermal, Q24HR, Tyrone Rucker M.D., Stopped at 10/19/19 1500  •  latanoprost (XALATAN) 0.005 % ophthalmic solution 2 Drop, 2 Drop, Both Eyes, Nightly, Moose English M.D., 2 Drop at 10/20/19 2050  •  timolol (TIMOPTIC) 0.5 % ophthalmic solution 1 Drop, 1 Drop, Right Eye, QAM, Moose English M.D., 1 Drop at 10/21/19 0517  •  Notify provider if pain remains uncontrolled, , , CONTINUOUS **AND** Use the numeric rating scale (NRS-11) on regular floors and Critical-Care Pain Observation Tool (CPOT) on ICUs/Trauma to assess pain, , , CONTINUOUS **AND** Pulse Ox (Oximetry), , , CONTINUOUS **AND** Pharmacy Consult Request ...Pain Management Review 1 Each, 1 Each, Other, PHARMACY TO DOSE **AND** If patient difficult to arouse and/or has respiratory depression, stop any opiates that are currently infusing and call a Rapid Response., , , CONTINUOUS **AND** [DISCONTINUED] oxyCODONE immediate-release (ROXICODONE) tablet 2.5 mg, 2.5 mg, Oral, Q3HRS PRN, Stopped at 10/19/19 1002 **AND** [DISCONTINUED] oxyCODONE immediate-release (ROXICODONE) tablet 5 mg, 5 mg, Oral, Q3HRS PRN, 5 mg at 10/19/19 1004 **AND** [DISCONTINUED] morphine (pf) 4 MG/ML injection 2 mg, 2 mg, Intravenous, Q3HRS PRN, Moose English M.D.  •  ondansetron (ZOFRAN) syringe/vial injection 4 mg, 4 mg, Intravenous, Q4HRS PRN, Moose English M.D., 4 mg at 10/18/19 1445  •  ondansetron (ZOFRAN ODT) dispertab 4 mg, 4 mg, Oral, Q4HRS PRN, Moose English M.D.  •  senna-docusate (PERICOLACE or SENOKOT S) 8.6-50 MG per tablet 2 Tab, 2 Tab, Oral, BID  **AND** polyethylene glycol/lytes (MIRALAX) PACKET 1 Packet, 1 Packet, Oral, QDAY PRN **AND** magnesium hydroxide (MILK OF MAGNESIA) suspension 30 mL, 30 mL, Oral, QDAY PRN **AND** bisacodyl (DULCOLAX) suppository 10 mg, 10 mg, Rectal, QDAY PRN, Moose English M.D.    Physical Examination:     Vitals:    10/21/19 0400 10/21/19 0500 10/21/19 0600 10/21/19 0700   BP: 145/86 153/76 155/93 137/73   Pulse: 70 73 72 73   Resp: 14 12 12 15   Temp: 36.9 °C (98.5 °F)  37 °C (98.6 °F)    TempSrc: Temporal  Temporal    SpO2: 97% 96% 95% 94%   Weight:       Height:           General: Patient is awake  Neck: There is normal range of motion  CV: RRR  Psychiatric: Mood and affect appear normal    NEUROLOGICAL EXAM:     Mental status: Awake, alert and fully oriented, follows commands  Speech and language: speech is clear and fluent. The patient is able to name and repeat.  Cranial nerve exam: Pupils are equal, round and reactive to light bilaterally. Visual fields are full. There is no nystagmus. Extraocular muscles are intact, + hypometric saccades. Face is symmetric. Sensation in the face is intact to light touch. Uvula is midline. Palate elevates symmetrically. Tongue is midline. Sternocleidomastoid muscles exhibit normal strength bilaterally.  Motor exam: Strength is 5/5 in all extremities both distally and proximally. Tone is normal. No abnormal movements were seen on exam.  Sensory exam: No sensory deficits identified   Deep tendon reflexes:  2+ and symmetric. Toes down-going bilaterally.  Coordination: dysmetria and discoordination on the right on finger erasto  Gait: deferred    Objective Data:    Labs:  Lab Results   Component Value Date/Time    PROTHROMBTM 14.3 10/18/2019 07:15 AM    INR 1.09 10/18/2019 07:15 AM      Lab Results   Component Value Date/Time    WBC 6.6 10/21/2019 05:25 AM    RBC 5.42 10/21/2019 05:25 AM    HEMOGLOBIN 17.3 10/21/2019 05:25 AM    HEMATOCRIT 48.8 10/21/2019 05:25 AM    MCV 90.0 10/21/2019  05:25 AM    MCH 31.9 10/21/2019 05:25 AM    MCHC 35.5 (H) 10/21/2019 05:25 AM    MPV 10.1 10/21/2019 05:25 AM    NEUTSPOLYS 85.40 (H) 10/21/2019 05:25 AM    LYMPHOCYTES 11.80 (L) 10/21/2019 05:25 AM    MONOCYTES 2.30 10/21/2019 05:25 AM    EOSINOPHILS 0.00 10/21/2019 05:25 AM    BASOPHILS 0.00 10/21/2019 05:25 AM      Lab Results   Component Value Date/Time    SODIUM 142 10/21/2019 05:25 AM    POTASSIUM 3.9 10/21/2019 05:25 AM    CHLORIDE 109 10/21/2019 05:25 AM    CO2 24 10/21/2019 05:25 AM    GLUCOSE 116 (H) 10/21/2019 05:25 AM    BUN 16 10/21/2019 05:25 AM    CREATININE 0.64 10/21/2019 05:25 AM      Lab Results   Component Value Date/Time    CHOLSTRLTOT 204 (H) 10/21/2019 05:25 AM     (H) 10/21/2019 05:25 AM    HDL 41 10/21/2019 05:25 AM    TRIGLYCERIDE 63 10/21/2019 05:25 AM       Lab Results   Component Value Date/Time    ALKPHOSPHAT 42 10/21/2019 05:25 AM    ASTSGOT 12 10/21/2019 05:25 AM    ALTSGPT 12 10/21/2019 05:25 AM    TBILIRUBIN 0.9 10/21/2019 05:25 AM        Imaging/Testing:  I interpreted the patient's neuroimaging MRI brain and can identify moderately large acute area of infarction involving the right hemicerebellum inferiorly and adjacent cerebellar vermis in the distribution of the posterior inferior cerebellar artery. I reviewed the patient's CTA head and neck as detailed in chart to note 4.2 cm ascending thoracic aortic aneurysm; otherwise unremarkable CT angiogram of the neck, and CT angiogram of the [place] within normal limits.    Assessment and Plan:    Yousif Jauregui is a 69 y.o. male with relevant history of HTN and HLD presenting for whom neurology was consulted to address right sided ataxia consistent with the infarct in the right cerebellum.  Patient has been managed on hypertonic saline for compression of the 4th ventricle.  Underlying etiology of the stroke likely related to the ascending aortic aneurism vs. A cardioembolic phenomenon.    Plan:    - Neurology checks and  vital signs per protocol  - goal BP <140 systolic   - obtain normoglycemia and avoid hypo- or hyper -natremia; aim for normothermia  - secondary stroke prevention therapy with ASA 325mg qd  - high intensity statin per SPARCL Trial  - evaluate and treat with PT/OT/ST  - discontinue hypertonic saline as patient is no longer in swelling window    The evaluation of the patient, and recommended management, was discussed with Dr. Rucker.    Roshan Handy MD  Director, [facility], [facility]  Neurohospitalist, [facility]  Clinical  of Neurology, [facility] [employer]  t) [phone] (f) [phone]    CRITICAL CARE    Upon my evaluation, this patient had a high probability of imminent or life-threatening deterioration due to cerebrovascular accident which required my direct attention, intervention, and personal management.  I personally provided 35 minutes of total critical care time outside of time spent on separately billable/documented procedures. Time includes: review of laboratory data, review of radiology studies, discussion with consultants, discussion with family/patient, monitoring for potential decompensation.  Interventions were performed as documented in the chart.

## 2019-10-21 NOTE — PROGRESS NOTES
Bedside report complete. NIH 1 with no acute changes noted from previous shift. Q1 neuro check done as well.

## 2019-10-22 ENCOUNTER — HOSPITAL ENCOUNTER (INPATIENT)
Facility: REHABILITATION | Age: 69
End: 2019-10-22
Attending: PHYSICAL MEDICINE & REHABILITATION | Admitting: PHYSICAL MEDICINE & REHABILITATION
Payer: MEDICARE

## 2019-10-22 LAB
ALBUMIN SERPL BCP-MCNC: 3.7 G/DL (ref 3.2–4.9)
ALBUMIN/GLOB SERPL: 2.2 G/DL
ALP SERPL-CCNC: 39 U/L (ref 30–99)
ALT SERPL-CCNC: 13 U/L (ref 2–50)
ANION GAP SERPL CALC-SCNC: 5 MMOL/L (ref 0–11.9)
ANION GAP SERPL CALC-SCNC: 6 MMOL/L (ref 0–11.9)
AST SERPL-CCNC: 14 U/L (ref 12–45)
BASOPHILS # BLD AUTO: 0.2 % (ref 0–1.8)
BASOPHILS # BLD: 0.02 K/UL (ref 0–0.12)
BILIRUB SERPL-MCNC: 1.3 MG/DL (ref 0.1–1.5)
BUN SERPL-MCNC: 10 MG/DL (ref 8–22)
BUN SERPL-MCNC: 10 MG/DL (ref 8–22)
BUN SERPL-MCNC: 15 MG/DL (ref 8–22)
BUN SERPL-MCNC: 9 MG/DL (ref 8–22)
CALCIUM SERPL-MCNC: 8.1 MG/DL (ref 8.5–10.5)
CALCIUM SERPL-MCNC: 8.2 MG/DL (ref 8.5–10.5)
CALCIUM SERPL-MCNC: 8.3 MG/DL (ref 8.5–10.5)
CALCIUM SERPL-MCNC: 8.3 MG/DL (ref 8.5–10.5)
CHLORIDE SERPL-SCNC: 109 MMOL/L (ref 96–112)
CHLORIDE SERPL-SCNC: 110 MMOL/L (ref 96–112)
CHLORIDE SERPL-SCNC: 110 MMOL/L (ref 96–112)
CHLORIDE SERPL-SCNC: 111 MMOL/L (ref 96–112)
CO2 SERPL-SCNC: 26 MMOL/L (ref 20–33)
CO2 SERPL-SCNC: 27 MMOL/L (ref 20–33)
CREAT SERPL-MCNC: 0.52 MG/DL (ref 0.5–1.4)
CREAT SERPL-MCNC: 0.6 MG/DL (ref 0.5–1.4)
CREAT SERPL-MCNC: 0.66 MG/DL (ref 0.5–1.4)
CREAT SERPL-MCNC: 0.75 MG/DL (ref 0.5–1.4)
EOSINOPHIL # BLD AUTO: 0.02 K/UL (ref 0–0.51)
EOSINOPHIL NFR BLD: 0.2 % (ref 0–6.9)
ERYTHROCYTE [DISTWIDTH] IN BLOOD BY AUTOMATED COUNT: 39.9 FL (ref 35.9–50)
GLOBULIN SER CALC-MCNC: 1.7 G/DL (ref 1.9–3.5)
GLUCOSE SERPL-MCNC: 100 MG/DL (ref 65–99)
GLUCOSE SERPL-MCNC: 95 MG/DL (ref 65–99)
GLUCOSE SERPL-MCNC: 96 MG/DL (ref 65–99)
GLUCOSE SERPL-MCNC: 97 MG/DL (ref 65–99)
HCT VFR BLD AUTO: 47.8 % (ref 42–52)
HGB BLD-MCNC: 17.2 G/DL (ref 14–18)
IMM GRANULOCYTES # BLD AUTO: 0.04 K/UL (ref 0–0.11)
IMM GRANULOCYTES NFR BLD AUTO: 0.4 % (ref 0–0.9)
LYMPHOCYTES # BLD AUTO: 1.93 K/UL (ref 1–4.8)
LYMPHOCYTES NFR BLD: 21.3 % (ref 22–41)
MAGNESIUM SERPL-MCNC: 1.5 MG/DL (ref 1.5–2.5)
MCH RBC QN AUTO: 32.3 PG (ref 27–33)
MCHC RBC AUTO-ENTMCNC: 36 G/DL (ref 33.7–35.3)
MCV RBC AUTO: 89.7 FL (ref 81.4–97.8)
MONOCYTES # BLD AUTO: 0.98 K/UL (ref 0–0.85)
MONOCYTES NFR BLD AUTO: 10.8 % (ref 0–13.4)
NEUTROPHILS # BLD AUTO: 6.05 K/UL (ref 1.82–7.42)
NEUTROPHILS NFR BLD: 67.1 % (ref 44–72)
NRBC # BLD AUTO: 0 K/UL
NRBC BLD-RTO: 0 /100 WBC
PHOSPHATE SERPL-MCNC: 2.5 MG/DL (ref 2.5–4.5)
PLATELET # BLD AUTO: 181 K/UL (ref 164–446)
PMV BLD AUTO: 10 FL (ref 9–12.9)
POTASSIUM SERPL-SCNC: 3.4 MMOL/L (ref 3.6–5.5)
POTASSIUM SERPL-SCNC: 3.6 MMOL/L (ref 3.6–5.5)
POTASSIUM SERPL-SCNC: 3.9 MMOL/L (ref 3.6–5.5)
POTASSIUM SERPL-SCNC: 4 MMOL/L (ref 3.6–5.5)
PROT SERPL-MCNC: 5.4 G/DL (ref 6–8.2)
RBC # BLD AUTO: 5.33 M/UL (ref 4.7–6.1)
SODIUM SERPL-SCNC: 141 MMOL/L (ref 135–145)
SODIUM SERPL-SCNC: 142 MMOL/L (ref 135–145)
SODIUM SERPL-SCNC: 143 MMOL/L (ref 135–145)
SODIUM SERPL-SCNC: 144 MMOL/L (ref 135–145)
WBC # BLD AUTO: 9 K/UL (ref 4.8–10.8)

## 2019-10-22 PROCEDURE — 85025 COMPLETE CBC W/AUTO DIFF WBC: CPT

## 2019-10-22 PROCEDURE — 83735 ASSAY OF MAGNESIUM: CPT

## 2019-10-22 PROCEDURE — 99222 1ST HOSP IP/OBS MODERATE 55: CPT | Performed by: PHYSICAL MEDICINE & REHABILITATION

## 2019-10-22 PROCEDURE — 99233 SBSQ HOSP IP/OBS HIGH 50: CPT | Performed by: HOSPITALIST

## 2019-10-22 PROCEDURE — A9270 NON-COVERED ITEM OR SERVICE: HCPCS | Performed by: FAMILY MEDICINE

## 2019-10-22 PROCEDURE — 700102 HCHG RX REV CODE 250 W/ 637 OVERRIDE(OP): Performed by: HOSPITALIST

## 2019-10-22 PROCEDURE — 700105 HCHG RX REV CODE 258: Performed by: INTERNAL MEDICINE

## 2019-10-22 PROCEDURE — 700111 HCHG RX REV CODE 636 W/ 250 OVERRIDE (IP): Performed by: HOSPITALIST

## 2019-10-22 PROCEDURE — A9270 NON-COVERED ITEM OR SERVICE: HCPCS | Performed by: HOSPITALIST

## 2019-10-22 PROCEDURE — 80048 BASIC METABOLIC PNL TOTAL CA: CPT

## 2019-10-22 PROCEDURE — 84100 ASSAY OF PHOSPHORUS: CPT

## 2019-10-22 PROCEDURE — 770022 HCHG ROOM/CARE - ICU (200)

## 2019-10-22 PROCEDURE — 700102 HCHG RX REV CODE 250 W/ 637 OVERRIDE(OP): Performed by: FAMILY MEDICINE

## 2019-10-22 PROCEDURE — 700111 HCHG RX REV CODE 636 W/ 250 OVERRIDE (IP): Performed by: INTERNAL MEDICINE

## 2019-10-22 PROCEDURE — 97530 THERAPEUTIC ACTIVITIES: CPT

## 2019-10-22 PROCEDURE — 700105 HCHG RX REV CODE 258: Performed by: HOSPITALIST

## 2019-10-22 PROCEDURE — 97535 SELF CARE MNGMENT TRAINING: CPT

## 2019-10-22 PROCEDURE — 80053 COMPREHEN METABOLIC PANEL: CPT

## 2019-10-22 PROCEDURE — 700111 HCHG RX REV CODE 636 W/ 250 OVERRIDE (IP): Performed by: NURSE PRACTITIONER

## 2019-10-22 PROCEDURE — 99233 SBSQ HOSP IP/OBS HIGH 50: CPT | Performed by: PSYCHIATRY & NEUROLOGY

## 2019-10-22 PROCEDURE — 700102 HCHG RX REV CODE 250 W/ 637 OVERRIDE(OP): Performed by: INTERNAL MEDICINE

## 2019-10-22 PROCEDURE — A9270 NON-COVERED ITEM OR SERVICE: HCPCS | Performed by: INTERNAL MEDICINE

## 2019-10-22 PROCEDURE — 700101 HCHG RX REV CODE 250: Performed by: HOSPITALIST

## 2019-10-22 PROCEDURE — 99291 CRITICAL CARE FIRST HOUR: CPT | Performed by: INTERNAL MEDICINE

## 2019-10-22 PROCEDURE — 92526 ORAL FUNCTION THERAPY: CPT

## 2019-10-22 RX ORDER — MAGNESIUM SULFATE HEPTAHYDRATE 40 MG/ML
4 INJECTION, SOLUTION INTRAVENOUS ONCE
Status: COMPLETED | OUTPATIENT
Start: 2019-10-22 | End: 2019-10-22

## 2019-10-22 RX ORDER — HEPARIN SODIUM 5000 [USP'U]/ML
5000 INJECTION, SOLUTION INTRAVENOUS; SUBCUTANEOUS EVERY 8 HOURS
Status: DISCONTINUED | OUTPATIENT
Start: 2019-10-22 | End: 2019-10-25 | Stop reason: HOSPADM

## 2019-10-22 RX ORDER — LISINOPRIL 20 MG/1
10 TABLET ORAL
Status: DISCONTINUED | OUTPATIENT
Start: 2019-10-22 | End: 2019-10-23

## 2019-10-22 RX ORDER — POTASSIUM CHLORIDE 20 MEQ/1
60 TABLET, EXTENDED RELEASE ORAL ONCE
Status: COMPLETED | OUTPATIENT
Start: 2019-10-22 | End: 2019-10-22

## 2019-10-22 RX ADMIN — POTASSIUM CHLORIDE 60 MEQ: 1500 TABLET, EXTENDED RELEASE ORAL at 08:00

## 2019-10-22 RX ADMIN — LABETALOL HYDROCHLORIDE 10 MG: 5 INJECTION INTRAVENOUS at 18:18

## 2019-10-22 RX ADMIN — ONDANSETRON 4 MG: 2 INJECTION INTRAMUSCULAR; INTRAVENOUS at 08:03

## 2019-10-22 RX ADMIN — TIMOLOL MALEATE 1 DROP: 5 SOLUTION OPHTHALMIC at 09:38

## 2019-10-22 RX ADMIN — HEPARIN SODIUM 5000 UNITS: 5000 INJECTION, SOLUTION INTRAVENOUS; SUBCUTANEOUS at 21:03

## 2019-10-22 RX ADMIN — MAGNESIUM SULFATE IN WATER 4 G: 40 INJECTION, SOLUTION INTRAVENOUS at 08:07

## 2019-10-22 RX ADMIN — SODIUM CHLORIDE 2.5 MG/HR: 9 INJECTION, SOLUTION INTRAVENOUS at 05:26

## 2019-10-22 RX ADMIN — SODIUM CHLORIDE 500 ML: 234 INJECTION, SOLUTION INTRAVENOUS at 19:26

## 2019-10-22 RX ADMIN — HEPARIN SODIUM 5000 UNITS: 5000 INJECTION, SOLUTION INTRAVENOUS; SUBCUTANEOUS at 16:04

## 2019-10-22 RX ADMIN — ASPIRIN 81 MG 324 MG: 81 TABLET ORAL at 05:26

## 2019-10-22 RX ADMIN — SODIUM CHLORIDE 2.5 MG/HR: 9 INJECTION, SOLUTION INTRAVENOUS at 13:16

## 2019-10-22 RX ADMIN — ATORVASTATIN CALCIUM 80 MG: 40 TABLET, FILM COATED ORAL at 21:03

## 2019-10-22 RX ADMIN — AMLODIPINE BESYLATE 10 MG: 10 TABLET ORAL at 05:26

## 2019-10-22 RX ADMIN — SODIUM CHLORIDE 500 ML: 234 INJECTION, SOLUTION INTRAVENOUS at 05:27

## 2019-10-22 RX ADMIN — SODIUM CHLORIDE: 234 INJECTION, SOLUTION INTRAVENOUS at 12:13

## 2019-10-22 RX ADMIN — LISINOPRIL 10 MG: 20 TABLET ORAL at 12:27

## 2019-10-22 ASSESSMENT — COGNITIVE AND FUNCTIONAL STATUS - GENERAL
DAILY ACTIVITIY SCORE: 22
SUGGESTED CMS G CODE MODIFIER DAILY ACTIVITY: CJ
WALKING IN HOSPITAL ROOM: A LITTLE
MOBILITY SCORE: 22
SUGGESTED CMS G CODE MODIFIER MOBILITY: CJ
HELP NEEDED FOR BATHING: A LITTLE
CLIMB 3 TO 5 STEPS WITH RAILING: A LITTLE
TOILETING: A LITTLE

## 2019-10-22 ASSESSMENT — ENCOUNTER SYMPTOMS
STRIDOR: 0
SENSORY CHANGE: 0
FOCAL WEAKNESS: 0
SHORTNESS OF BREATH: 0
COUGH: 0
ABDOMINAL PAIN: 0
SPUTUM PRODUCTION: 0
FEVER: 0
HEADACHES: 0
NAUSEA: 1
CHILLS: 0
DIZZINESS: 1
MYALGIAS: 0
SPEECH CHANGE: 0
HEADACHES: 1
DIZZINESS: 0
VOMITING: 0

## 2019-10-22 ASSESSMENT — GAIT ASSESSMENTS
GAIT LEVEL OF ASSIST: MINIMAL ASSIST
DISTANCE (FEET): 60
ASSISTIVE DEVICE: FRONT WHEEL WALKER

## 2019-10-22 ASSESSMENT — PATIENT HEALTH QUESTIONNAIRE - PHQ9
SUM OF ALL RESPONSES TO PHQ9 QUESTIONS 1 AND 2: 0
1. LITTLE INTEREST OR PLEASURE IN DOING THINGS: NOT AT ALL

## 2019-10-22 NOTE — PROGRESS NOTES
"Neurosurgery  No changes overnight  He denies ha/n/v or confusion  Systolic BP: 130's    CT yesterday shows evolving infarct-no hydrocephalus      Objective:  VSS  Na+ 143  A&O X4  PERRL  No drift  CN II-XII: Right eye vision fields decreased, otherwise CN are grossly intact        /92   Pulse 80   Temp 36.7 °C (98 °F) (Temporal)   Resp 15   Ht 1.803 m (5' 10.98\")   Wt 78.2 kg (172 lb 6.4 oz)   SpO2 95%     Intake/Output Summary (Last 24 hours) at 10/21/2019 0758  Last data filed at 10/21/2019 0600  Gross per 24 hour   Intake 1471.59 ml   Output 1600 ml   Net -128.41 ml       Recent Labs     10/20/19  0415 10/21/19  0525 10/22/19  0545   WBC 6.8 6.6 9.0   RBC 5.36 5.42 5.33   HEMOGLOBIN 17.1 17.3 17.2   HEMATOCRIT 48.3 48.8 47.8   MCV 90.1 90.0 89.7   MCH 31.9 31.9 32.3   MCHC 35.4* 35.5* 36.0*   RDW 39.3 38.5 39.9   PLATELETCT 182 206 181   MPV 9.8 10.1 10.0     Recent Labs     10/21/19  1730 10/21/19  2330 10/22/19  0545   SODIUM 143 144 143   POTASSIUM 4.0 3.6 3.4*   CHLORIDE 113* 111 110   CO2 22 27 27   GLUCOSE 101* 95 100*   BUN 16 15 10           CT-HEAD W/O   Final Result         1.  Low-density changes in the right cerebellar hemisphere, appearance compatible with evolving infarct.   2.  Otherwise nonspecific white matter changes, commonly associated with small vessel ischemic disease.  Associated mild cerebral atrophy is noted.   3.  Left maxillary sinusitis changes   4.  Atherosclerosis.      EC-ECHOCARDIOGRAM COMPLETE W/O CONT   Final Result      DX-CHEST-FOR LINE PLACEMENT Perform procedure in: Patient's Room   Final Result      Right IJ central catheter is well-positioned. No pneumothorax.      MR-BRAIN-WITH & W/O   Final Result         1.  Moderately large acute area of infarction involving the right hemicerebellum inferiorly and adjacent cerebellar vermis in the distribution of the posterior inferior cerebellar artery.      2.  Age-related cerebral atrophy.      3.  Mild periventricular " white matter changes consistent with chronic microvascular ischemic gliosis.      DX-CHEST-2 VIEWS   Final Result      No acute cardiopulmonary abnormality identified.      MR-CERVICAL SPINE-W/O   Final Result         1.  Moderate to severe discal and endplate degenerative changes from the C3-4 level through the C5-6 level with mild marginal osteophytosis.      2.  Minimal to mild multilevel cervical spondylotic change from the C3-4 level through the C6-7 level most pronounced at the C4-5 level.      3.  Moderate to severe multilevel neural foraminal narrowing.      DX-LUMBAR PUNCTURE FOR DIAGNOSIS   Final Result      Fluoroscopic-guided lumbar puncture as described above.      CT-CTA HEAD WITH & W/O-POST PROCESS   Final Result         1.  CT angiogram of the [place] within normal limits.         CT-CTA NECK WITH & W/O-POST PROCESSING   Final Result         1.  4.2 cm ascending thoracic aortic aneurysm, radiographic follow-up and surveillance recommended as clinically appropriate.   2.  Otherwise unremarkable CT angiogram of the neck.      These findings were discussed with the patient's clinician, ANDREIA GIRON, on 10/17/2019 9:37 PM.            Assessment:  Active Hospital Problems    Diagnosis   • CVA (cerebral vascular accident) (HCC) [I63.9]     Priority: High   • Dizziness [R42]     Priority: High   • Headache [R51]     Priority: High   • Neck pain [M54.2]     Priority: High   • Essential hypertension [I10]     Priority: Medium   • Glaucoma [H40.9]     Priority: Low       HD#5  Impression     1.  History of suboccipital headache with a right cerebellar infarct-now 3 days out     2.  Local mass-effect only.  No hydrocephalus.     3.  Neurological state okay        Plan:     1.  Keep systolic blood pressure less than 160     2.  Aim for sodium 145-155    3. Q4 neuro checks. OK to floor from NSX standpoint    4. NSX to sign off- Available if needed    No surgery or EVD planned at this time

## 2019-10-22 NOTE — PROGRESS NOTES
"Critical Care Progress Note    Date of admission  10/17/2019    Chief Complaint  69 y.o. male admitted 10/17/2019 with CVA    Hospital Course    \"69 y.o. male who presented 10/17/2019 with headache.  CTA head neck normal.  MRI neck cervical degeneration, no acute intervention per NS.  MRI 10/19 showed right cerebellar infarction with some compression 4th ventricle.  Discussed with NS, no intervention.  Discussed with neurology, hypertonic saline.  SBP less than 160 per neurosurgery.  Patient is alert and oriented.  Headache on arrival ongoing for several days.  Says more dizziness and balance issues rather than focal weakness..  Right side dysmetria.\"      Interval Problem Update  Reviewed last 24 hour events:   - wean 3% per neuro   - Neuro: AOx4   - HR: 60s-80s   - SBP: on nicard at 2.5   - GI: Tolerating diet with some nausea   - UOP: Adequate   - Sepulveda: no    - Tm: afeb   - Lines: CVC, PIV   - PPx: GI not indicated, DVT heparin   - 2L NC   - CXR (personally reviewed and compared to prior): No new    YESTERDAY   - No acute events overnight   - Neuro: NIHSS 0, Right sided coordination deficits   - HR: 50s-70s   - SBP: 120s-140s   - GI: tolerating diet   - UOP: 1.6L since admit   - Sepulveda: no   - Tm: 37.4   - Lines: CVC, PIV   - PPx: GI not indicated, DVT SCDs   - 2L NC   - CXR (personally reviewed and compared to prior): no new    Review of Systems  Review of Systems   Constitutional: Negative for chills and fever.   Respiratory: Negative for cough, sputum production, shortness of breath and stridor.    Cardiovascular: Negative for chest pain.   Gastrointestinal: Positive for nausea. Negative for abdominal pain and vomiting.   Genitourinary: Negative for dysuria.   Musculoskeletal: Negative for myalgias.   Skin: Negative for rash.   Neurological: Positive for dizziness and headaches. Negative for sensory change and focal weakness.        Vital Signs for last 24 hours   Temp:  [36.7 °C (98 °F)-37.2 °C (98.9 °F)] " 36.7 °C (98 °F)  Pulse:  [65-91] (P) 74  Resp:  [10-95] (P) 20  BP: (115-162)/() (P) 124/78  SpO2:  [92 %-96 %] (P) 92 %    Hemodynamic parameters for last 24 hours       Respiratory Information for the last 24 hours       Physical Exam   Physical Exam   Constitutional: He is oriented to person, place, and time. He appears well-developed and well-nourished.   HENT:   Head: Normocephalic and atraumatic.   Right Ear: External ear normal.   Left Ear: External ear normal.   Nose: Nose normal.   Eyes: Pupils are equal, round, and reactive to light. Conjunctivae and EOM are normal. No scleral icterus.   Neck: Neck supple. No JVD present. No tracheal deviation present.   Cardiovascular: Normal rate, regular rhythm and intact distal pulses.   Pulmonary/Chest: Effort normal and breath sounds normal. No respiratory distress.   Abdominal: Soft. Bowel sounds are normal. He exhibits no distension.   Musculoskeletal: Normal range of motion. He exhibits no edema or tenderness.   Neurological: He is alert and oriented to person, place, and time. No cranial nerve deficit. He exhibits normal muscle tone. Coordination (right sided past-pointing on FTN, improved from prior) abnormal.   Skin: Skin is warm and dry.   Psychiatric: He has a normal mood and affect.   Nursing note and vitals reviewed.      Medications  Current Facility-Administered Medications   Medication Dose Route Frequency Provider Last Rate Last Dose   • magnesium sulfate IVPB premix 4 g  4 g Intravenous Once Gautam Barragan Jr., D.O. 25 mL/hr at 10/22/19 0807 4 g at 10/22/19 0807   • sodium chloride (SALT) tablet 2 g  2 g Oral TID WITH MEALS Gautam Barragan Jr., D.O.   0.5 g at 10/21/19 1728   • aspirin (ASA) chewable tab 324 mg  324 mg Oral DAILY Myke Flanagan M.D.   324 mg at 10/22/19 0516   • enalaprilat (VASOTEC) injection 1.25 mg  1.25 mg Intravenous Q6HRS PRN Myke Flanagan M.D.   1.25 mg at 10/21/19 1808   • labetalol (NORMODYNE,TRANDATE)  injection 10 mg  10 mg Intravenous Q10 MIN PRN Myke Flanagan M.D.       • atorvastatin (LIPITOR) tablet 80 mg  80 mg Oral QHS Tyrone Rucker M.D.   80 mg at 10/21/19 1952   • 3% sodium chloride (HYPERTONIC SALINE) 500mL infusion 500 mL  500 mL Intravenous Continuous John Byrne M.D. 90 mL/hr at 10/22/19 0635     • MD Alert...ICU Electrolyte Replacement per Pharmacy   Other PHARMACY TO DOSE John Byrne M.D.       • amLODIPine (NORVASC) tablet 10 mg  10 mg Oral Q DAY John Byrne M.D.   10 mg at 10/22/19 0526   • niCARdipine (CARDENE) 25 mg in  mL Infusion  0-15 mg/hr Intravenous Continuous Myke Flanagan M.D. 25 mL/hr at 10/22/19 0812 2.5 mg/hr at 10/22/19 0812   • hydrALAZINE (APRESOLINE) injection 10-20 mg  10-20 mg Intravenous Q6HRS PRN Myke Flanagan M.D.   20 mg at 10/21/19 1952   • lidocaine (LIDODERM) 5 % 2 Patch  2 Patch Transdermal Q24HR Tyrone Rucker M.D.   Stopped at 10/19/19 1500   • latanoprost (XALATAN) 0.005 % ophthalmic solution 2 Drop  2 Drop Both Eyes Nightly Moose English M.D.   2 Drop at 10/21/19 1956   • timolol (TIMOPTIC) 0.5 % ophthalmic solution 1 Drop  1 Drop Right Eye QAM Moose English M.D.   1 Drop at 10/21/19 0517   • Pharmacy Consult Request ...Pain Management Review 1 Each  1 Each Other PHARMACY TO DOSE Moose English M.D.       • ondansetron (ZOFRAN) syringe/vial injection 4 mg  4 mg Intravenous Q4HRS PRN Moose English M.D.   4 mg at 10/22/19 0803   • ondansetron (ZOFRAN ODT) dispertab 4 mg  4 mg Oral Q4HRS PRN Moose English M.D.       • senna-docusate (PERICOLACE or SENOKOT S) 8.6-50 MG per tablet 2 Tab  2 Tab Oral BID Moose English M.D.   Stopped at 10/22/19 0600    And   • polyethylene glycol/lytes (MIRALAX) PACKET 1 Packet  1 Packet Oral QDAY PRN Moose English M.D.        And   • magnesium hydroxide (MILK OF MAGNESIA) suspension 30 mL  30 mL Oral QDAY PRN Moose English M.D.        And   • bisacodyl (DULCOLAX) suppository 10 mg  10  mg Rectal QDAY PRN Moose English M.D.           Fluids    Intake/Output Summary (Last 24 hours) at 10/22/2019 0844  Last data filed at 10/22/2019 0600  Gross per 24 hour   Intake 2235.5 ml   Output 3850 ml   Net -1614.5 ml       Laboratory          Recent Labs     10/21/19  1730 10/21/19  2330 10/22/19  0545   SODIUM 143 144 143   POTASSIUM 4.0 3.6 3.4*   CHLORIDE 113* 111 110   CO2 22 27 27   BUN 16 15 10   CREATININE 0.64 0.75 0.52   MAGNESIUM  --   --  1.5   PHOSPHORUS  --   --  2.5   CALCIUM 8.3* 8.2* 8.3*     Recent Labs     10/20/19  0415  10/21/19  0525  10/21/19  1730 10/21/19  2330 10/22/19  0545   ALTSGPT 9  --  12  --   --   --  13   ASTSGOT 13  --  12  --   --   --  14   ALKPHOSPHAT 40  --  42  --   --   --  39   TBILIRUBIN 1.4  --  0.9  --   --   --  1.3   GLUCOSE 91   < > 116*   < > 101* 95 100*    < > = values in this interval not displayed.     Recent Labs     10/20/19  0415 10/21/19  0525 10/22/19  0545   WBC 6.8 6.6 9.0   NEUTSPOLYS 61.30 85.40* 67.10   LYMPHOCYTES 25.00 11.80* 21.30*   MONOCYTES 12.60 2.30 10.80   EOSINOPHILS 0.70 0.00 0.20   BASOPHILS 0.30 0.00 0.20   ASTSGOT 13 12 14   ALTSGPT 9 12 13   ALKPHOSPHAT 40 42 39   TBILIRUBIN 1.4 0.9 1.3     Recent Labs     10/20/19  0415 10/21/19  0525 10/22/19  0545   RBC 5.36 5.42 5.33   HEMOGLOBIN 17.1 17.3 17.2   HEMATOCRIT 48.3 48.8 47.8   PLATELETCT 182 206 181       Imaging  X-Ray:  I have personally reviewed the images and compared with prior images.  CT:    Reviewed    Assessment/Plan  * CVA (cerebral vascular accident) (HCC)- (present on admission)  Assessment & Plan  Right side cerebellar infarct  Keep sbp <160 mmHg  Amlodipine 10 mg daily, prn medications  No intervention for now per neurosurgery  ASA but not dual antiplatelet  Possible embolic, has hx aortic aneurysm  Continue NaCl tabs, wean 3% to maintain normal natremia per neurology  PICA distribution  Statin  MRI 10/19  Neuro and Neurosx    Dizziness- (present on  admission)  Assessment & Plan  2/2 cerebellar CVA  Continue PT and OT    Neck pain- (present on admission)  Assessment & Plan  Secondary to cerebellar stroke  Improving    Headache- (present on admission)  Assessment & Plan  Narcotics and valproate off  Improved  Secondary to cerebellar ischemia per neurology    Essential hypertension- (present on admission)  Assessment & Plan  Amlodipine daily  Prn medications  Goal sbp <160 mmHg    Glaucoma- (present on admission)  Assessment & Plan  No acute issues       VTE:  SCDs  Ulcer: Not Indicated  Lines: Central Line  Ongoing indication addressed    I have performed a physical exam and reviewed and updated ROS and Plan today (10/22/2019). In review of yesterday's note (10/21/2019), there are no changes except as documented above.     Titrating nicardipine and hypertonic saline infusions. This patient is critically ill, at high risk for decompensation leading to worsening vital organ dysfunction and death without critical care interventions.    Discussed patient condition and risk of morbidity and/or mortality with Hospitalist, RN, RT, Pharmacy, Dietary, , Code status disscussed, Charge nurse / hot rounds, Patient and neurology     The patient remains critically ill.  Critical care time = 35 minutes in directly providing and coordinating critical care and extensive data review.  No time overlap and excludes procedures.

## 2019-10-22 NOTE — PROGRESS NOTES
[facility] Neurology Progress Note:     CC: Headache     HPI: 69 y.o. male w/ PMHx of HTN who presented 10/17/2019 with headache and right sided ataxia. CT/CTA head and neck negative, MRI completed 10/19 revealing right cerebellar infarction with some compression 4th ventricle, thus neurology was consulted.     Interval History:   Day 6 of hypertonic saline, recommend discontinuation and normalization of sodium       Subjective: still with some mild headache and neck pain but improved from previous, denies any new complaints     Objective:   Vitals:    10/22/19 0945 10/22/19 1000 10/22/19 1015 10/22/19 1030   BP: (P) 133/81 (P) 134/83 (P) 130/82 (P) 135/85   Pulse: (P) 72 (P) 68 (P) 67 (P) 62   Resp: (P) 15 (!) (P) 26 (P) 20 (P) 12   Temp:       TempSrc:       SpO2:  (P) 94%     Weight:       Height:           Labs:     Lab Results   Component Value Date/Time    PROTHROMBTM 14.3 10/18/2019 07:15 AM    INR 1.09 10/18/2019 07:15 AM      Lab Results   Component Value Date/Time    WBC 9.0 10/22/2019 05:45 AM    RBC 5.33 10/22/2019 05:45 AM    HEMOGLOBIN 17.2 10/22/2019 05:45 AM    HEMATOCRIT 47.8 10/22/2019 05:45 AM    MCV 89.7 10/22/2019 05:45 AM    MCH 32.3 10/22/2019 05:45 AM    MCHC 36.0 (H) 10/22/2019 05:45 AM    MPV 10.0 10/22/2019 05:45 AM    NEUTSPOLYS 67.10 10/22/2019 05:45 AM    LYMPHOCYTES 21.30 (L) 10/22/2019 05:45 AM    MONOCYTES 10.80 10/22/2019 05:45 AM    EOSINOPHILS 0.20 10/22/2019 05:45 AM    BASOPHILS 0.20 10/22/2019 05:45 AM      Lab Results   Component Value Date/Time    SODIUM 143 10/22/2019 05:45 AM    POTASSIUM 3.4 (L) 10/22/2019 05:45 AM    CHLORIDE 110 10/22/2019 05:45 AM    CO2 27 10/22/2019 05:45 AM    GLUCOSE 100 (H) 10/22/2019 05:45 AM    BUN 10 10/22/2019 05:45 AM    CREATININE 0.52 10/22/2019 05:45 AM      Lab Results   Component Value Date/Time    CHOLSTRLTOT 204 (H) 10/21/2019 05:25 AM     (H) 10/21/2019 05:25 AM    HDL 41 10/21/2019 05:25 AM    TRIGLYCERIDE 63 10/21/2019 05:25 AM        Lab Results   Component Value Date/Time    ALKPHOSPHAT 39 10/22/2019 05:45 AM    ASTSGOT 14 10/22/2019 05:45 AM    ALTSGPT 13 10/22/2019 05:45 AM    TBILIRUBIN 1.3 10/22/2019 05:45 AM        Imaging/Testing:   CT-HEAD W/O   Final Result         1.  Low-density changes in the right cerebellar hemisphere, appearance compatible with evolving infarct.   2.  Otherwise nonspecific white matter changes, commonly associated with small vessel ischemic disease.  Associated mild cerebral atrophy is noted.   3.  Left maxillary sinusitis changes   4.  Atherosclerosis.      EC-ECHOCARDIOGRAM COMPLETE W/O CONT   Final Result      DX-CHEST-FOR LINE PLACEMENT Perform procedure in: Patient's Room   Final Result      Right IJ central catheter is well-positioned. No pneumothorax.      MR-BRAIN-WITH & W/O   Final Result         1.  Moderately large acute area of infarction involving the right hemicerebellum inferiorly and adjacent cerebellar vermis in the distribution of the posterior inferior cerebellar artery.      2.  Age-related cerebral atrophy.      3.  Mild periventricular white matter changes consistent with chronic microvascular ischemic gliosis.      DX-CHEST-2 VIEWS   Final Result      No acute cardiopulmonary abnormality identified.      MR-CERVICAL SPINE-W/O   Final Result         1.  Moderate to severe discal and endplate degenerative changes from the C3-4 level through the C5-6 level with mild marginal osteophytosis.      2.  Minimal to mild multilevel cervical spondylotic change from the C3-4 level through the C6-7 level most pronounced at the C4-5 level.      3.  Moderate to severe multilevel neural foraminal narrowing.      DX-LUMBAR PUNCTURE FOR DIAGNOSIS   Final Result      Fluoroscopic-guided lumbar puncture as described above.      CT-CTA HEAD WITH & W/O-POST PROCESS   Final Result         1.  CT angiogram of the [place] within normal limits.         CT-CTA NECK WITH & W/O-POST PROCESSING   Final  Result         1.  4.2 cm ascending thoracic aortic aneurysm, radiographic follow-up and surveillance recommended as clinically appropriate.   2.  Otherwise unremarkable CT angiogram of the neck.      These findings were discussed with the patient's clinician, ANDREIA GIRON, on 10/17/2019 9:37 PM.            Physical Exam:     General: non-toxic appearing male, sitting up resting comfortably   Cardio: RRR, Normal S1/S2. No peripheral edema.   Pulm: CTAX2. No respiratory distress.   Skin: Warm, dry, no rashes or lesions  Psychiatric: flat affect. No active psychosis.  HEENT: Atraumatic head, normal sclera and conjunctiva, moist oral mucosa.  Abdomen: Soft, non tender. No masses or hepatosplenomegaly.    Neurologic:  Mental Status: awake and alert and fully oriented to time, place, situation AAOx4. Able to follow commands/cross midline. Speech fluent/nondysarthric. Language functions intact. No neglect/apraxia.  Cranial Nerves: PERRL. EOMi. + hypometric saccades. Face symmetric, palate/tongue midline. Visual fields full to confrontation. Facial sensation intact.   Motor:  Normal muscle tone and bulk. Strength is 5/5 throughout. No abnormal movements.  Reflexes: 2/4 throughout. Flexor plantar responses bilaterally.  Coordination: finger/nose intact, dysmetria on right finger erasto   Sensation: sensation intact in all 4   Gait/Station: deferred     Assessment/Plan:    Yousif Jauregui is a 69 y.o. male with relevant history of HTN and HLD presenting for whom neurology was consulted to address right sided ataxia consistent with the infarct in the right cerebellum.  Patient has been managed on hypertonic saline for compression of the 4th ventricle.  Underlying etiology of the stroke likely related to the ascending aortic aneurism vs. A cardioembolic phenomenon.     Plan:     - Neurology checks and vital signs per protocol  - goal BP <140 systolic   - discontinue hypertonic saline and obtain normoglycemia and avoid hypo- or  hyper -natremia; aim for normothermia  - secondary stroke prevention therapy with ASA 325mg qd and high intensity statin   - evaluate and treat with PT/OT/ST    Daphney Douglass M.D.   PGY 3 [facility] Internal Medicine   Neurology     -----------------    The patient was seen and examined. I agree with plan above; see any corrections below:    I saw and evaluated the patient and discussed the management with the primary medical team, nursing, and resident staff. I reviewed Dr. Daphney Douglass's note and agree with their findings and plan as documented in the note except as documented below. The chart was reviewed and summarized.  As listed in reviewed note any/all available labs, imaging, vitals were reviewed and neuroimaging visualized. Available nursing, consultant, and resident notes were reviewed.    Roshan Handy MD  Director, [facility], [facility]  Neurohospitalist, [facility]  Clinical  of Neurology, [facility] [employer]  t) [phone] (f) [phone]    Referring Provider-  Tyrone Rcuker M.D.    CRITICAL CARE    Upon my evaluation, this patient had a high probability of imminent or life-threatening deterioration due to cerebrovascular accident which required my direct attention, intervention, and personal management.  I personally provided 35 minutes of total critical care time outside of time spent on separately billable/documented procedures. Time includes: review of laboratory data, review of radiology studies, discussion with consultants, discussion with family/patient, monitoring for potential decompensation.  Interventions were performed as documented in the chart.
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
## 2019-10-22 NOTE — THERAPY
"Speech Language Therapy dysphagia treatment completed.   Functional Status:  Attempted to see in the AM with pt given Zofran for nausea. Pt seen during the lunch meal with wife stating P.T. suggested pt sitting EOB or in chair for meals. Pt able to sit EOB with assist to manage his blankets. Pt with intake of approx 50% of spaghetti with meat sauce and sips of thin water. No delay in swallow, no coughing post swallow, and no oral residue. Pt and his wife deny any cognitive or language deficits at this time. SLP provided educ to the pt and his wife, and collaborated with nurs, regarding monitor only for dysphagia. Consider cognitive/language eval if pt demonstrates any deficits.   Recommendations: regular and thins as tolerated.   Plan of Care: monitor only  Post-Acute Therapy: no further dysphagia tx. ThanksLeon    See \"Rehab Therapy-Acute\" Patient Summary Report for complete documentation.     "

## 2019-10-22 NOTE — DISCHARGE PLANNING
Care Transition Team Assessment  In the case of an emergency, pt's legal NOK is wife, Lissette     RNCM met with pt at bedside and obtained the information used in this assessment. Pt verified accuracy of facesheet. Pt lives in a 2 story home with wife.  Pt uses [facility] in [place]. Prior to current hospitalization, pt was completely independent in ADLS/IADLS. Pt drives and is able to attend necessary MD appointments.  Pt has no financial concerns. Pt has a good support system. Pt admits any hx of substance use and denies any dx of mh. Pt has been evaluated for acute rehab. Pt likely too high functioning for rehab. Home with HH vs Outpt rehab.  Information Source:pt  Orientation : Oriented x 4  Information Given By: Patient  Informant's Name: (Yousif)  Who is responsible for making decisions for patient? : Patient         Elopement Risk  Legal Hold: No  Ambulatory or Self Mobile in Wheelchair: Yes  Disoriented: No  Psychiatric Symptoms: None  History of Wandering: No  Elopement this Admit: No  Vocalizing Wanting to Leave: No  Displays Behaviors, Body Language Wanting to Leave: No-Not at Risk for Elopement  Elopement Risk: Not at Risk for Elopement    Interdisciplinary Discharge Planning  Does Admitting Nurse Feel This Could be a Complex Discharge?: No  Primary Care Physician: (Dr. Rocco Verma)  Lives with - Patient's Self Care Capacity: Spouse  Patient or legal guardian wants to designate a caregiver (see row info): No  Support Systems: Family Member(s)  Housing / Facility: 2 Story House  Do You Take your Prescribed Medications Regularly: Yes  Mobility Issues: No  Prior Services: None    Discharge Preparedness  What is your plan after discharge?: Uncertain - pending medical team collaboration  What are your discharge supports?: Child, Spouse  Prior Functional Level: Ambulatory, Drives Self, Independent with Activities of Daily Living, Independent with Medication  "Management  Difficulity with ADLs: None  Difficulity with IADLs: None    Functional Assesment  Prior Functional Level: Ambulatory, Drives Self, Independent with Activities of Daily Living, Independent with Medication Management    Finances  Financial Barriers to Discharge: No  Prescription Coverage: Yes    Vision / Hearing Impairment  Vision Impairment : Yes(red)  Right Eye Vision: (glaucoma)  Left Eye Vision: Other (Comments)(red)  Hearing Impairment : No              Domestic Abuse  Have you ever been the victim of abuse or violence?: No  Physical Abuse or Sexual Abuse: No  Verbal Abuse or Emotional Abuse: No  Possible Abuse Reported to:: Not Applicable    Psychological Assessment  History of Substance Abuse: Alcohol  Date Last Used - Alcohol: (10/16/2019)  Substance Abuse Comments: (\"a couple of doubles a night\")  History of Psychiatric Problems: No                  "

## 2019-10-22 NOTE — PROGRESS NOTES
[facility] Medicine Daily Progress Note    Date of Service  10/22/2019    Chief Complaint  69 y.o. male admitted 10/17/2019 with headache.    Hospital Course    Mr. Jauregui has a past medical history of hypertension was in his usual state of health until 10/17/2019 he developed acute onset of headache and neck pain therefore presented to the emergency room.  He presented to the emergency room in [facility] where there was suspicion for subarachnoid hemorrhage therefore was transferred here for higher level of care.  He was found to have a right cerebellar ischemic infarct has been admitted to the intensive care unit.      Interval Problem Update  10/21 patient found with significant right cerebella infarction, significant edema with possible compression of the fourth ventricle.  Transferred to ICU, discussed with neurosurgery and neurology.  No need for surgical intervention.  Patient feels improved, less posterior headache, heart rate in 50s to 70s, blood pressure 120-140.  Afebrile, follow-up CT head shows continued low-density changes otherwise no acute abnormalities or hemorrhage  10/22: Patient seen and evaluated in the intensive care unit. He is on a 3% drip at 90 ml/hour. He is not able to tolerate salt tabs due to nausea. 2.5 liters of urine over night. He remains on a nicardipine drip due to elevated blood pressure. His wife is at bedside. He takes norvasc 10 mg daily and we discussed that he will require more medications.     Consultants/Specialty  Critical care. I discussed with Dr. Barragan on ICU Hot Rounds.  Neurosurgery  Neurology   Code Status  Full     Disposition  ICU due to cerebellar stroke with high-rate 3% saline and nicardipine drip.    Review of Systems  Review of Systems   Constitutional: Negative for chills and fever.   Respiratory: Negative for shortness of breath.    Neurological: Negative for dizziness, sensory change, speech change and headaches.   All other systems reviewed and  are negative.       Physical Exam  Temp:  [36.7 °C (98 °F)-37.2 °C (98.9 °F)] 36.7 °C (98 °F)  Pulse:  [65-91] 80  Resp:  [10-95] 15  BP: (115-162)/() 155/92  SpO2:  [92 %-96 %] 95 %    Physical Exam   Constitutional: He is oriented to person, place, and time. No distress.   HENT:   No facial droop   Eyes: Right eye exhibits no discharge. Left eye exhibits no discharge.   Neck: Normal range of motion. Neck supple.   Right IJ central line   Cardiovascular: Normal rate and regular rhythm.   No murmur heard.  Pulmonary/Chest: Effort normal. No respiratory distress. He has no wheezes.   Abdominal: Soft. He exhibits no distension. There is no tenderness.   Musculoskeletal: He exhibits no edema or tenderness.   Neurological: He is alert and oriented to person, place, and time.   5/5 strength bilateral upper and lower extremities.    Skin: Skin is warm and dry. He is not diaphoretic.   Psychiatric: He has a normal mood and affect. His behavior is normal.   Speech clear and concise   Nursing note and vitals reviewed.      Fluids    Intake/Output Summary (Last 24 hours) at 10/22/2019 0659  Last data filed at 10/22/2019 0600  Gross per 24 hour   Intake 2475.5 ml   Output 4050 ml   Net -1574.5 ml       Laboratory  Recent Labs     10/20/19  0415 10/21/19  0525 10/22/19  0545   WBC 6.8 6.6 9.0   RBC 5.36 5.42 5.33   HEMOGLOBIN 17.1 17.3 17.2   HEMATOCRIT 48.3 48.8 47.8   MCV 90.1 90.0 89.7   MCH 31.9 31.9 32.3   MCHC 35.4* 35.5* 36.0*   RDW 39.3 38.5 39.9   PLATELETCT 182 206 181   MPV 9.8 10.1 10.0     Recent Labs     10/21/19  1730 10/21/19  2330 10/22/19  0545   SODIUM 143 144 143   POTASSIUM 4.0 3.6 3.4*   CHLORIDE 113* 111 110   CO2 22 27 27   GLUCOSE 101* 95 100*   BUN 16 15 10   CREATININE 0.64 0.75 0.52   CALCIUM 8.3* 8.2* 8.3*             Recent Labs     10/21/19  0525   TRIGLYCERIDE 63   HDL 41   *       Imaging  CT-HEAD W/O   Final Result         1.  Low-density changes in the right cerebellar hemisphere,  appearance compatible with evolving infarct.   2.  Otherwise nonspecific white matter changes, commonly associated with small vessel ischemic disease.  Associated mild cerebral atrophy is noted.   3.  Left maxillary sinusitis changes   4.  Atherosclerosis.      EC-ECHOCARDIOGRAM COMPLETE W/O CONT   Final Result      DX-CHEST-FOR LINE PLACEMENT Perform procedure in: Patient's Room   Final Result      Right IJ central catheter is well-positioned. No pneumothorax.      MR-BRAIN-WITH & W/O   Final Result         1.  Moderately large acute area of infarction involving the right hemicerebellum inferiorly and adjacent cerebellar vermis in the distribution of the posterior inferior cerebellar artery.      2.  Age-related cerebral atrophy.      3.  Mild periventricular white matter changes consistent with chronic microvascular ischemic gliosis.      DX-CHEST-2 VIEWS   Final Result      No acute cardiopulmonary abnormality identified.      MR-CERVICAL SPINE-W/O   Final Result         1.  Moderate to severe discal and endplate degenerative changes from the C3-4 level through the C5-6 level with mild marginal osteophytosis.      2.  Minimal to mild multilevel cervical spondylotic change from the C3-4 level through the C6-7 level most pronounced at the C4-5 level.      3.  Moderate to severe multilevel neural foraminal narrowing.      DX-LUMBAR PUNCTURE FOR DIAGNOSIS   Final Result      Fluoroscopic-guided lumbar puncture as described above.      CT-CTA HEAD WITH & W/O-POST PROCESS   Final Result         1.  CT angiogram of the [place] within normal limits.         CT-CTA NECK WITH & W/O-POST PROCESSING   Final Result         1.  4.2 cm ascending thoracic aortic aneurysm, radiographic follow-up and surveillance recommended as clinically appropriate.   2.  Otherwise unremarkable CT angiogram of the neck.      These findings were discussed with the patient's clinician, ANDREIA GIRON, on 10/17/2019 9:37 PM.            Assessment/Plan  * CVA (cerebral vascular accident) (HCC)- (present on admission)  Assessment & Plan  Right cerebellar infarct in the area of the posterior inferior cerebellar artery presenting 10/17/2019  Started aspirin   High-intensity statin  Echocardiogram negative for clot  Continuous telemetry monitoring to eval for atrial fibrillation  Neurosurgery consultation  Neurology consulted  Blood pressure control  PT/OT/SLP   3% drip at 90 ml/hour and titrate down to normal sodium levels    Neck pain- (present on admission)  Assessment & Plan  Secondary to cerebral infarct, follow    Headache- (present on admission)  Assessment & Plan  Secondary to CVA, symptomatic treatment  Cerebral spinal fluid was negative for meningitis/encephalitis     Essential hypertension- (present on admission)  Assessment & Plan  Keep systolic blood pressure less than 140 as per neurology  PRN IV Enalapril, hydralazine, and labetalol  Continue norvasc 10 mg daily home medication  Requiring an IV nicardipine drip thus ICU  Potassium is low thus add lisinopril 10 mg daily and possibly titrate up as needed    Glaucoma- (present on admission)  Assessment & Plan  Continue current outpatient regimen.    No new blurriness.       VTE prophylaxis: SCDs

## 2019-10-22 NOTE — CARE PLAN
Problem: Communication  Goal: The ability to communicate needs accurately and effectively will improve  Outcome: PROGRESSING AS EXPECTED   Patient and wife educated on plan of care, interventions, medications. Questions addressed.    Problem: Safety  Goal: Will remain free from falls  Outcome: PROGRESSING AS EXPECTED    Bed in low position and locked. Patient educated on use of call light. Bed alarm activated.     Problem: Bowel/Gastric:  Goal: Will not experience complications related to bowel motility  Outcome: PROGRESSING SLOWER THAN EXPECTED      Patient refusing bowel regimen. Last bowel movement prior to arrival. Education provided.

## 2019-10-22 NOTE — THERAPY
"Occupational Therapy Treatment completed with focus on ADLs, ADL transfers and patient education.  Functional Status: Pt seen for OT tx session. Pt SPV for supine>sit, min A for sit>Stand, SPV for LB dressing and grooming while standing. Pt limited due to fatigue and standing balance. Pt's BP monitored through tx session. Pt 117/73 with initial supine>sit and 140/78 during standing functional task. Anticipate pt will be able to d/c home with assist from SO. Will continue to follow for acute OT services while in-house.   Plan of Care: Will benefit from Occupational Therapy 5 times per week  Discharge Recommendations:  Equipment Will Continue to Assess for Equipment Needs. Recommend home health transitional care for continued occupational therapy services.     See \"Rehab Therapy-Acute\" Patient Summary Report for complete documentation.   "

## 2019-10-22 NOTE — THERAPY
"Physical Therapy Evaluation completed.   Bed Mobility:  Supine to Sit:Stand By Assist (for safety; pt tends to move quickly)  Transfers: Sit to Stand: Stand By Assist  Gait: Level Of Assist: Minimal Assist with Front-Wheel Walker       Plan of Care: Will benefit from Physical Therapy 5 times per week  Discharge Recommendations: Equipment: Will Continue to Assess for Equipment Needs.     Pt has improved his mobility and today performed x60' of gait with FWW with visually more improved control of L LE. Minimal Assist provided for stability and safety, although pt did not demonstrate an overt LOB. Distance appeared to be most limited by fatigue as pt reported not having slept well overnight. BP ranging in 130's/80's throughout mobility. Spouse present and supportive. If pt continues to progress as he has, anticipate him to be functionally capable of return home. Otherwise, may require placement. Will continue to assess.     See \"Rehab Therapy-Acute\" Patient Summary Report for complete documentation.     "

## 2019-10-23 LAB
ALBUMIN SERPL BCP-MCNC: 3.4 G/DL (ref 3.2–4.9)
ALBUMIN/GLOB SERPL: 1.9 G/DL
ALP SERPL-CCNC: 36 U/L (ref 30–99)
ALT SERPL-CCNC: 15 U/L (ref 2–50)
ANION GAP SERPL CALC-SCNC: 3 MMOL/L (ref 0–11.9)
ANION GAP SERPL CALC-SCNC: 5 MMOL/L (ref 0–11.9)
ANION GAP SERPL CALC-SCNC: 6 MMOL/L (ref 0–11.9)
AST SERPL-CCNC: 14 U/L (ref 12–45)
BACTERIA BLD CULT: NORMAL
BACTERIA BLD CULT: NORMAL
BASOPHILS # BLD AUTO: 0.6 % (ref 0–1.8)
BASOPHILS # BLD: 0.04 K/UL (ref 0–0.12)
BILIRUB SERPL-MCNC: 1.1 MG/DL (ref 0.1–1.5)
BUN SERPL-MCNC: 10 MG/DL (ref 8–22)
BUN SERPL-MCNC: 9 MG/DL (ref 8–22)
BUN SERPL-MCNC: 9 MG/DL (ref 8–22)
CALCIUM SERPL-MCNC: 7.9 MG/DL (ref 8.5–10.5)
CALCIUM SERPL-MCNC: 8.1 MG/DL (ref 8.5–10.5)
CALCIUM SERPL-MCNC: 8.2 MG/DL (ref 8.5–10.5)
CHLORIDE SERPL-SCNC: 108 MMOL/L (ref 96–112)
CHLORIDE SERPL-SCNC: 111 MMOL/L (ref 96–112)
CHLORIDE SERPL-SCNC: 113 MMOL/L (ref 96–112)
CO2 SERPL-SCNC: 27 MMOL/L (ref 20–33)
CO2 SERPL-SCNC: 27 MMOL/L (ref 20–33)
CO2 SERPL-SCNC: 29 MMOL/L (ref 20–33)
CREAT SERPL-MCNC: 0.54 MG/DL (ref 0.5–1.4)
CREAT SERPL-MCNC: 0.62 MG/DL (ref 0.5–1.4)
CREAT SERPL-MCNC: 0.63 MG/DL (ref 0.5–1.4)
EOSINOPHIL # BLD AUTO: 0.08 K/UL (ref 0–0.51)
EOSINOPHIL NFR BLD: 1.1 % (ref 0–6.9)
ERYTHROCYTE [DISTWIDTH] IN BLOOD BY AUTOMATED COUNT: 41.3 FL (ref 35.9–50)
GLOBULIN SER CALC-MCNC: 1.8 G/DL (ref 1.9–3.5)
GLUCOSE SERPL-MCNC: 66 MG/DL (ref 65–99)
GLUCOSE SERPL-MCNC: 79 MG/DL (ref 65–99)
GLUCOSE SERPL-MCNC: 96 MG/DL (ref 65–99)
HCT VFR BLD AUTO: 46.3 % (ref 42–52)
HGB BLD-MCNC: 15.8 G/DL (ref 14–18)
IMM GRANULOCYTES # BLD AUTO: 0.03 K/UL (ref 0–0.11)
IMM GRANULOCYTES NFR BLD AUTO: 0.4 % (ref 0–0.9)
LYMPHOCYTES # BLD AUTO: 1.9 K/UL (ref 1–4.8)
LYMPHOCYTES NFR BLD: 26.4 % (ref 22–41)
MAGNESIUM SERPL-MCNC: 1.8 MG/DL (ref 1.5–2.5)
MCH RBC QN AUTO: 31.5 PG (ref 27–33)
MCHC RBC AUTO-ENTMCNC: 34.1 G/DL (ref 33.7–35.3)
MCV RBC AUTO: 92.2 FL (ref 81.4–97.8)
MONOCYTES # BLD AUTO: 0.79 K/UL (ref 0–0.85)
MONOCYTES NFR BLD AUTO: 11 % (ref 0–13.4)
NEUTROPHILS # BLD AUTO: 4.37 K/UL (ref 1.82–7.42)
NEUTROPHILS NFR BLD: 60.5 % (ref 44–72)
NRBC # BLD AUTO: 0 K/UL
NRBC BLD-RTO: 0 /100 WBC
PLATELET # BLD AUTO: 182 K/UL (ref 164–446)
PMV BLD AUTO: 10.3 FL (ref 9–12.9)
POTASSIUM SERPL-SCNC: 3.7 MMOL/L (ref 3.6–5.5)
POTASSIUM SERPL-SCNC: 3.8 MMOL/L (ref 3.6–5.5)
POTASSIUM SERPL-SCNC: 3.8 MMOL/L (ref 3.6–5.5)
PROT SERPL-MCNC: 5.2 G/DL (ref 6–8.2)
RBC # BLD AUTO: 5.02 M/UL (ref 4.7–6.1)
SIGNIFICANT IND 70042: NORMAL
SIGNIFICANT IND 70042: NORMAL
SITE SITE: NORMAL
SITE SITE: NORMAL
SODIUM SERPL-SCNC: 140 MMOL/L (ref 135–145)
SODIUM SERPL-SCNC: 144 MMOL/L (ref 135–145)
SODIUM SERPL-SCNC: 145 MMOL/L (ref 135–145)
SOURCE SOURCE: NORMAL
SOURCE SOURCE: NORMAL
WBC # BLD AUTO: 7.2 K/UL (ref 4.8–10.8)

## 2019-10-23 PROCEDURE — 700111 HCHG RX REV CODE 636 W/ 250 OVERRIDE (IP): Performed by: INTERNAL MEDICINE

## 2019-10-23 PROCEDURE — 700111 HCHG RX REV CODE 636 W/ 250 OVERRIDE (IP): Performed by: HOSPITALIST

## 2019-10-23 PROCEDURE — A9270 NON-COVERED ITEM OR SERVICE: HCPCS | Performed by: INTERNAL MEDICINE

## 2019-10-23 PROCEDURE — 700102 HCHG RX REV CODE 250 W/ 637 OVERRIDE(OP): Performed by: INTERNAL MEDICINE

## 2019-10-23 PROCEDURE — 700105 HCHG RX REV CODE 258: Performed by: INTERNAL MEDICINE

## 2019-10-23 PROCEDURE — A9270 NON-COVERED ITEM OR SERVICE: HCPCS | Performed by: HOSPITALIST

## 2019-10-23 PROCEDURE — 85025 COMPLETE CBC W/AUTO DIFF WBC: CPT

## 2019-10-23 PROCEDURE — 700102 HCHG RX REV CODE 250 W/ 637 OVERRIDE(OP): Performed by: HOSPITALIST

## 2019-10-23 PROCEDURE — 99233 SBSQ HOSP IP/OBS HIGH 50: CPT | Performed by: PSYCHIATRY & NEUROLOGY

## 2019-10-23 PROCEDURE — 99232 SBSQ HOSP IP/OBS MODERATE 35: CPT | Performed by: HOSPITALIST

## 2019-10-23 PROCEDURE — 700102 HCHG RX REV CODE 250 W/ 637 OVERRIDE(OP): Performed by: FAMILY MEDICINE

## 2019-10-23 PROCEDURE — 80053 COMPREHEN METABOLIC PANEL: CPT

## 2019-10-23 PROCEDURE — 770001 HCHG ROOM/CARE - MED/SURG/GYN PRIV*

## 2019-10-23 PROCEDURE — 80048 BASIC METABOLIC PNL TOTAL CA: CPT

## 2019-10-23 PROCEDURE — 83735 ASSAY OF MAGNESIUM: CPT

## 2019-10-23 PROCEDURE — A9270 NON-COVERED ITEM OR SERVICE: HCPCS | Performed by: FAMILY MEDICINE

## 2019-10-23 PROCEDURE — 99291 CRITICAL CARE FIRST HOUR: CPT | Performed by: INTERNAL MEDICINE

## 2019-10-23 PROCEDURE — 700111 HCHG RX REV CODE 636 W/ 250 OVERRIDE (IP): Performed by: NURSE PRACTITIONER

## 2019-10-23 RX ORDER — LISINOPRIL 20 MG/1
10 TABLET ORAL ONCE
Status: COMPLETED | OUTPATIENT
Start: 2019-10-23 | End: 2019-10-23

## 2019-10-23 RX ORDER — LISINOPRIL 20 MG/1
20 TABLET ORAL
Status: DISCONTINUED | OUTPATIENT
Start: 2019-10-24 | End: 2019-10-25 | Stop reason: HOSPADM

## 2019-10-23 RX ORDER — POTASSIUM CHLORIDE 20 MEQ/1
40 TABLET, EXTENDED RELEASE ORAL ONCE
Status: COMPLETED | OUTPATIENT
Start: 2019-10-23 | End: 2019-10-23

## 2019-10-23 RX ORDER — MAGNESIUM SULFATE HEPTAHYDRATE 40 MG/ML
2 INJECTION, SOLUTION INTRAVENOUS ONCE
Status: COMPLETED | OUTPATIENT
Start: 2019-10-23 | End: 2019-10-23

## 2019-10-23 RX ADMIN — LABETALOL HYDROCHLORIDE 10 MG: 5 INJECTION INTRAVENOUS at 17:14

## 2019-10-23 RX ADMIN — SODIUM CHLORIDE 500 ML: 234 INJECTION, SOLUTION INTRAVENOUS at 09:23

## 2019-10-23 RX ADMIN — LISINOPRIL 10 MG: 20 TABLET ORAL at 05:26

## 2019-10-23 RX ADMIN — MAGNESIUM SULFATE 2 G: 2 INJECTION INTRAVENOUS at 07:19

## 2019-10-23 RX ADMIN — HYDRALAZINE HYDROCHLORIDE 10 MG: 20 INJECTION INTRAMUSCULAR; INTRAVENOUS at 01:05

## 2019-10-23 RX ADMIN — ATORVASTATIN CALCIUM 80 MG: 40 TABLET, FILM COATED ORAL at 20:24

## 2019-10-23 RX ADMIN — LISINOPRIL 10 MG: 20 TABLET ORAL at 10:25

## 2019-10-23 RX ADMIN — POTASSIUM CHLORIDE 40 MEQ: 1500 TABLET, EXTENDED RELEASE ORAL at 07:18

## 2019-10-23 RX ADMIN — ENALAPRILAT 1.25 MG: 1.25 INJECTION INTRAVENOUS at 06:28

## 2019-10-23 RX ADMIN — LABETALOL HYDROCHLORIDE 10 MG: 5 INJECTION INTRAVENOUS at 09:03

## 2019-10-23 RX ADMIN — HEPARIN SODIUM 5000 UNITS: 5000 INJECTION, SOLUTION INTRAVENOUS; SUBCUTANEOUS at 05:25

## 2019-10-23 RX ADMIN — ASPIRIN 81 MG 324 MG: 81 TABLET ORAL at 05:25

## 2019-10-23 RX ADMIN — LATANOPROST 2 DROP: 50 SOLUTION OPHTHALMIC at 20:24

## 2019-10-23 RX ADMIN — HEPARIN SODIUM 5000 UNITS: 5000 INJECTION, SOLUTION INTRAVENOUS; SUBCUTANEOUS at 13:49

## 2019-10-23 RX ADMIN — AMLODIPINE BESYLATE 10 MG: 10 TABLET ORAL at 05:26

## 2019-10-23 RX ADMIN — TIMOLOL MALEATE 1 DROP: 5 SOLUTION OPHTHALMIC at 05:27

## 2019-10-23 RX ADMIN — HEPARIN SODIUM 5000 UNITS: 5000 INJECTION, SOLUTION INTRAVENOUS; SUBCUTANEOUS at 20:27

## 2019-10-23 ASSESSMENT — ENCOUNTER SYMPTOMS
NAUSEA: 1
COUGH: 0
HEADACHES: 0
FOCAL WEAKNESS: 0
DIZZINESS: 1
HEADACHES: 1
VOMITING: 0
SPUTUM PRODUCTION: 0
FEVER: 0
SPEECH CHANGE: 0
DIZZINESS: 0
STRIDOR: 0
ABDOMINAL PAIN: 0
SENSORY CHANGE: 0
MYALGIAS: 0
CHILLS: 0
SHORTNESS OF BREATH: 0

## 2019-10-23 NOTE — DISCHARGE PLANNING
TCC/  Rehab consult completed.    Recommendations for Out Pt PT/OT for balance/coordination/ataxia in [place] where pt lives w/ his wife who is supportive.   Thank you for referral.

## 2019-10-23 NOTE — CARE PLAN
Problem: Safety  Goal: Will remain free from falls  Outcome: PROGRESSING AS EXPECTED  Risk for falls assessed, bed alarm on, bed locked and in lowest position, pt room near nurses station, pt rounded on q1hr, call light in place. Pt and family educated on fall risk precautions and need to call RN before getting up.    Goal: Will remain free from injury  Outcome: PROGRESSING AS EXPECTED     Problem: Safety  Goal: Free from accidental injury  Outcome: PROGRESSING AS EXPECTED     Problem: Neuro Status  Goal: Monitor neuro status and rapid identification of neuro changes  Outcome: PROGRESSING AS EXPECTED  Neuro checks per orders     Problem: Pain Management  Goal: Pain level will decrease to patient's comfort goal  Outcome: PROGRESSING AS EXPECTED  Pt educated on use of 0-10 pain scale, non-pharmacological pain interventions in use, pain assessed at a minimum of q 2 hrs.        Problem: Fluid Volume:  Goal: Will maintain balanced intake and output  Outcome: PROGRESSING AS EXPECTED     Problem: Mobility  Goal: Risk for activity intolerance will decrease  Outcome: PROGRESSING AS EXPECTED  Will encourage patient to mobilize to the best of their ability. - works with PT/OT

## 2019-10-23 NOTE — PROGRESS NOTES
"Critical Care Progress Note    Date of admission  10/17/2019    Chief Complaint  69 y.o. male admitted 10/17/2019 with CVA    Hospital Course    \"69 y.o. male who presented 10/17/2019 with headache.  CTA head neck normal.  MRI neck cervical degeneration, no acute intervention per NS.  MRI 10/19 showed right cerebellar infarction with some compression 4th ventricle.  Discussed with NS, no intervention.  Discussed with neurology, hypertonic saline.  SBP less than 160 per neurosurgery.  Patient is alert and oriented.  Headache on arrival ongoing for several days.  Says more dizziness and balance issues rather than focal weakness..  Right side dysmetria.\"      Interval Problem Update  Reviewed last 24 hour events:   - still weaning 3%, patient is refusing Salt tabs   - Neuro: AOx4   - HR: 60s-70s   - SBP: 120s-140s, PRN x2   - GI: tolerating diet   - UOP: 2.9L/24 h   - Sepulveda: no   - Tm: 37.3   - Lines: PIV   - PPx: GI not indicated, DVT heparin   - 1L NC   - CXR (personally reviewed and compared to prior): no new    YESTERDAY   - wean 3% per neuro   - Neuro: AOx4   - HR: 60s-80s   - SBP: on nicard at 2.5   - GI: Tolerating diet with some nausea   - UOP: Adequate   - Sepulveda: no    - Tm: afeb   - Lines: CVC, PIV   - PPx: GI not indicated, DVT heparin   - 2L NC   - CXR (personally reviewed and compared to prior): No new    Review of Systems  Review of Systems   Constitutional: Negative for chills and fever.   Respiratory: Negative for cough, sputum production, shortness of breath and stridor.    Cardiovascular: Negative for chest pain.   Gastrointestinal: Positive for nausea. Negative for abdominal pain and vomiting.   Genitourinary: Negative for dysuria.   Musculoskeletal: Negative for myalgias.   Skin: Negative for rash.   Neurological: Positive for dizziness and headaches. Negative for sensory change and focal weakness.        Vital Signs for last 24 hours   Temp:  [36.4 °C (97.5 °F)-37.3 °C (99.1 °F)] 37.3 °C (99.1 " °F)  Pulse:  [58-89] 60  Resp:  [12-31] 12  BP: (114-163)/(62-95) 134/79  SpO2:  [90 %-97 %] 92 %    Hemodynamic parameters for last 24 hours       Respiratory Information for the last 24 hours       Physical Exam   Physical Exam   Constitutional: He is oriented to person, place, and time. He appears well-developed and well-nourished.   HENT:   Head: Normocephalic and atraumatic.   Right Ear: External ear normal.   Left Ear: External ear normal.   Nose: Nose normal.   Eyes: Pupils are equal, round, and reactive to light. Conjunctivae and EOM are normal. No scleral icterus.   Neck: Neck supple. No JVD present. No tracheal deviation present.   Cardiovascular: Normal rate, regular rhythm and intact distal pulses.   Pulmonary/Chest: Effort normal and breath sounds normal. No respiratory distress.   Abdominal: Soft. Bowel sounds are normal. He exhibits no distension.   Musculoskeletal: Normal range of motion. He exhibits no edema or tenderness.   Neurological: He is alert and oriented to person, place, and time. No cranial nerve deficit. He exhibits normal muscle tone. Coordination (right sided past-pointing on FTN, continued improved exam today) abnormal.   Skin: Skin is warm and dry.   Psychiatric: He has a normal mood and affect.   Nursing note and vitals reviewed.      Medications  Current Facility-Administered Medications   Medication Dose Route Frequency Provider Last Rate Last Dose   • magnesium sulfate IVPB premix 2 g  2 g Intravenous Once Gautam Barragan Jr., D.O.   Stopped at 10/23/19 0919   • lisinopril (PRINIVIL) tablet 10 mg  10 mg Oral Q DAY Lew March M.D.   10 mg at 10/23/19 0526   • heparin injection 5,000 Units  5,000 Units Subcutaneous Q8HRS FLORENCIA CisnerosP.RSilasNSilas   5,000 Units at 10/23/19 0525   • sodium chloride (SALT) tablet 2 g  2 g Oral TID WITH MEALS Gautam Barragan Jr., D.O.   0.5 g at 10/21/19 1728   • aspirin (ASA) chewable tab 324 mg  324 mg Oral DAILY Myke Flanagan M.D.   324 mg  at 10/23/19 0525   • enalaprilat (VASOTEC) injection 1.25 mg  1.25 mg Intravenous Q6HRS PRN Myke Flanagan M.D.   1.25 mg at 10/23/19 0628   • labetalol (NORMODYNE,TRANDATE) injection 10 mg  10 mg Intravenous Q10 MIN PRN Myke Flanagan M.D.   10 mg at 10/22/19 1818   • atorvastatin (LIPITOR) tablet 80 mg  80 mg Oral QHS Tyrone Rucker M.D.   80 mg at 10/22/19 2103   • 3% sodium chloride (HYPERTONIC SALINE) 500mL infusion 500 mL  500 mL Intravenous Continuous Gautam Barragan Jr., D.O. 25 mL/hr at 10/23/19 0048     • MD Alert...ICU Electrolyte Replacement per Pharmacy   Other PHARMACY TO DOSE John Byrne M.D.       • amLODIPine (NORVASC) tablet 10 mg  10 mg Oral Q DAY John Byrne M.D.   10 mg at 10/23/19 0526   • niCARdipine (CARDENE) 25 mg in  mL Infusion  0-15 mg/hr Intravenous Continuous Myke Flanagan M.D.   Stopped at 10/22/19 1603   • hydrALAZINE (APRESOLINE) injection 10-20 mg  10-20 mg Intravenous Q6HRS PRN Myke Flanagan M.D.   10 mg at 10/23/19 0105   • lidocaine (LIDODERM) 5 % 2 Patch  2 Patch Transdermal Q24HR Tyrone Rucker M.D.   Stopped at 10/19/19 1500   • latanoprost (XALATAN) 0.005 % ophthalmic solution 2 Drop  2 Drop Both Eyes Nightly Moose English M.D.   2 Drop at 10/21/19 1956   • timolol (TIMOPTIC) 0.5 % ophthalmic solution 1 Drop  1 Drop Right Eye QAM Moose English M.D.   1 Drop at 10/23/19 0527   • Pharmacy Consult Request ...Pain Management Review 1 Each  1 Each Other PHARMACY TO DOSE Moose English M.D.       • ondansetron (ZOFRAN) syringe/vial injection 4 mg  4 mg Intravenous Q4HRS PRN Moose English M.D.   4 mg at 10/22/19 0803   • ondansetron (ZOFRAN ODT) dispertab 4 mg  4 mg Oral Q4HRS PRN Moose English M.D.       • senna-docusate (PERICOLACE or SENOKOT S) 8.6-50 MG per tablet 2 Tab  2 Tab Oral BID Moose English M.D.   Stopped at 10/22/19 0600    And   • polyethylene glycol/lytes (MIRALAX) PACKET 1 Packet  1 Packet Oral QDAY PRN Moose DON  JERRICA English        And   • magnesium hydroxide (MILK OF MAGNESIA) suspension 30 mL  30 mL Oral QDAY PRN Moose English M.D.        And   • bisacodyl (DULCOLAX) suppository 10 mg  10 mg Rectal QDAY PRN Moose English M.D.           Fluids    Intake/Output Summary (Last 24 hours) at 10/23/2019 0853  Last data filed at 10/23/2019 0800  Gross per 24 hour   Intake 2088.25 ml   Output 2350 ml   Net -261.75 ml       Laboratory          Recent Labs     10/22/19  0545  10/22/19  1759 10/23/19  0001 10/23/19  0530   SODIUM 143   < > 141 145 144   POTASSIUM 3.4*   < > 3.9 3.7 3.8   CHLORIDE 110   < > 110 113* 111   CO2 27   < > 26 29 27   BUN 10   < > 10 10 9   CREATININE 0.52   < > 0.66 0.63 0.62   MAGNESIUM 1.5  --   --   --  1.8   PHOSPHORUS 2.5  --   --   --   --    CALCIUM 8.3*   < > 8.1* 7.9* 8.1*    < > = values in this interval not displayed.     Recent Labs     10/21/19  0525  10/22/19  0545  10/22/19  1759 10/23/19  0001 10/23/19  0530   ALTSGPT 12  --  13  --   --   --  15   ASTSGOT 12  --  14  --   --   --  14   ALKPHOSPHAT 42  --  39  --   --   --  36   TBILIRUBIN 0.9  --  1.3  --   --   --  1.1   GLUCOSE 116*   < > 100*   < > 96 66 96    < > = values in this interval not displayed.     Recent Labs     10/21/19  0525 10/22/19  0545 10/23/19  0530   WBC 6.6 9.0 7.2   NEUTSPOLYS 85.40* 67.10 60.50   LYMPHOCYTES 11.80* 21.30* 26.40   MONOCYTES 2.30 10.80 11.00   EOSINOPHILS 0.00 0.20 1.10   BASOPHILS 0.00 0.20 0.60   ASTSGOT 12 14 14   ALTSGPT 12 13 15   ALKPHOSPHAT 42 39 36   TBILIRUBIN 0.9 1.3 1.1     Recent Labs     10/21/19  0525 10/22/19  0545 10/23/19  0530   RBC 5.42 5.33 5.02   HEMOGLOBIN 17.3 17.2 15.8   HEMATOCRIT 48.8 47.8 46.3   PLATELETCT 206 181 182       Imaging  X-Ray:  I have personally reviewed the images and compared with prior images.  CT:    Reviewed    Assessment/Plan  * CVA (cerebral vascular accident) (HCC)- (present on admission)  Assessment & Plan  Right side cerebellar infarct  PICA  distribution  Keep sbp <160 mmHg  Amlodipine 10 mg daily, prn medications  No intervention for now per neurosurgery  ASA but not dual antiplatelet  Possible embolic, has hx aortic aneurysm  Patient refusing NaCl tabs, continue to wean 3% infusion to maintain normonatremia  Statin  MRI 10/19  Neuro and Neurosx    Dizziness- (present on admission)  Assessment & Plan  2/2 cerebellar CVA  Continue PT and OT    Neck pain- (present on admission)  Assessment & Plan  Secondary to cerebellar stroke  Improving    Headache- (present on admission)  Assessment & Plan  Narcotics and valproate off  Improved  Secondary to cerebellar ischemia per neurology    Essential hypertension- (present on admission)  Assessment & Plan  Amlodipine daily  Prn medications  Goal sbp <160 mmHg    Glaucoma- (present on admission)  Assessment & Plan  No acute issues       VTE:  SCDs  Ulcer: Not Indicated  Lines: Central Line  Ongoing indication addressed    I have performed a physical exam and reviewed and updated ROS and Plan today (10/23/2019). In review of yesterday's note (10/22/2019), there are no changes except as documented above.     Titrating hypertonic saline infusion. This patient is critically ill, at high risk for decompensation leading to worsening vital organ dysfunction and death without critical care interventions.    Discussed patient condition and risk of morbidity and/or mortality with Hospitalist, Family, RN, RT, Pharmacy, Dietary, , Code status disscussed, Charge nurse / hot rounds, Patient and neurology     The patient remains critically ill.  Critical care time = 31 minutes in directly providing and coordinating critical care and extensive data review.  No time overlap and excludes procedures.

## 2019-10-23 NOTE — PROGRESS NOTES
[facility] Medicine Daily Progress Note    Date of Service  10/23/2019    Chief Complaint  69 y.o. male admitted 10/17/2019 with headache.    Hospital Course    Mr. Jauregui has a past medical history of hypertension was in his usual state of health until 10/17/2019 he developed acute onset of headache and neck pain therefore presented to the emergency room.  He presented to the emergency room in [facility] where there was suspicion for subarachnoid hemorrhage therefore was transferred here for higher level of care.  He was found to have a right cerebellar ischemic infarct has been admitted to the intensive care unit.      Interval Problem Update  10/21 patient found with significant right cerebella infarction, significant edema with possible compression of the fourth ventricle.  Transferred to ICU, discussed with neurosurgery and neurology.  No need for surgical intervention.  Patient feels improved, less posterior headache, heart rate in 50s to 70s, blood pressure 120-140.  Afebrile, follow-up CT head shows continued low-density changes otherwise no acute abnormalities or hemorrhage  10/22: Patient seen and evaluated in the intensive care unit. He is on a 3% drip at 90 ml/hour. He is not able to tolerate salt tabs due to nausea. 2.5 liters of urine over night. He remains on a nicardipine drip due to elevated blood pressure. His wife is at bedside. He takes norvasc 10 mg daily and we discussed that he will require more medications.   10/23: Mr. Jauregui was evaluated and examined in the ICU. He was given IV hydralazine, IV labetalol, and IV enalapril over night due to elevated blood pressures. He is on IV 3% drip at 25 ml/hour in the morning which was turned off. He has been ambulating and tolerating a diet. Mr. Jauregui wants to do rehab in [place].   Consultants/Specialty  Critical care. I discussed with Dr. Barragan on ICU Hot Rounds.  Neurosurgery  Neurology   Code Status  Full      Disposition  neurosurgery.    Review of Systems  Review of Systems   Constitutional: Negative for fever.   Respiratory: Negative for shortness of breath.    Gastrointestinal:        Less nausea  Tolerating a diet   Neurological: Negative for dizziness, sensory change, speech change and headaches.        Ambulating   All other systems reviewed and are negative.       Physical Exam  Temp:  [36.3 °C (97.3 °F)-37.1 °C (98.7 °F)] 36.8 °C (98.2 °F)  Pulse:  [58-89] 63  Resp:  [12-31] 12  BP: (114-163)/(62-95) 146/90  SpO2:  [90 %-97 %] 92 %    Physical Exam   Constitutional: He is oriented to person, place, and time. No distress.   Eyes: Right eye exhibits no discharge. Left eye exhibits no discharge.   Neck: Normal range of motion. Neck supple.   Right IJ central line   Cardiovascular: Normal rate and regular rhythm.   No murmur heard.  Pulmonary/Chest: Effort normal. No respiratory distress. He has no wheezes.   Abdominal: Soft. He exhibits no distension. There is no tenderness.   Musculoskeletal: He exhibits no edema or tenderness.   Neurological: He is alert and oriented to person, place, and time.   5/5 strength bilateral upper and lower extremities.   No facial droop   Skin: Skin is warm and dry. He is not diaphoretic.   Psychiatric: He has a normal mood and affect. His behavior is normal.   Speech clear and good voice quality   Nursing note and vitals reviewed.      Fluids    Intake/Output Summary (Last 24 hours) at 10/23/2019 0703  Last data filed at 10/23/2019 0600  Gross per 24 hour   Intake 2240.34 ml   Output 2930 ml   Net -689.66 ml       Laboratory  Recent Labs     10/21/19  0525 10/22/19  0545 10/23/19  0530   WBC 6.6 9.0 7.2   RBC 5.42 5.33 5.02   HEMOGLOBIN 17.3 17.2 15.8   HEMATOCRIT 48.8 47.8 46.3   MCV 90.0 89.7 92.2   MCH 31.9 32.3 31.5   MCHC 35.5* 36.0* 34.1   RDW 38.5 39.9 41.3   PLATELETCT 206 181 182   MPV 10.1 10.0 10.3     Recent Labs     10/22/19  1759 10/23/19  0001 10/23/19  0530    SODIUM 141 145 144   POTASSIUM 3.9 3.7 3.8   CHLORIDE 110 113* 111   CO2 26 29 27   GLUCOSE 96 66 96   BUN 10 10 9   CREATININE 0.66 0.63 0.62   CALCIUM 8.1* 7.9* 8.1*             Recent Labs     10/21/19  0525   TRIGLYCERIDE 63   HDL 41   *       Imaging  CT-HEAD W/O   Final Result         1.  Low-density changes in the right cerebellar hemisphere, appearance compatible with evolving infarct.   2.  Otherwise nonspecific white matter changes, commonly associated with small vessel ischemic disease.  Associated mild cerebral atrophy is noted.   3.  Left maxillary sinusitis changes   4.  Atherosclerosis.      EC-ECHOCARDIOGRAM COMPLETE W/O CONT   Final Result      DX-CHEST-FOR LINE PLACEMENT Perform procedure in: Patient's Room   Final Result      Right IJ central catheter is well-positioned. No pneumothorax.      MR-BRAIN-WITH & W/O   Final Result         1.  Moderately large acute area of infarction involving the right hemicerebellum inferiorly and adjacent cerebellar vermis in the distribution of the posterior inferior cerebellar artery.      2.  Age-related cerebral atrophy.      3.  Mild periventricular white matter changes consistent with chronic microvascular ischemic gliosis.      DX-CHEST-2 VIEWS   Final Result      No acute cardiopulmonary abnormality identified.      MR-CERVICAL SPINE-W/O   Final Result         1.  Moderate to severe discal and endplate degenerative changes from the C3-4 level through the C5-6 level with mild marginal osteophytosis.      2.  Minimal to mild multilevel cervical spondylotic change from the C3-4 level through the C6-7 level most pronounced at the C4-5 level.      3.  Moderate to severe multilevel neural foraminal narrowing.      DX-LUMBAR PUNCTURE FOR DIAGNOSIS   Final Result      Fluoroscopic-guided lumbar puncture as described above.      CT-CTA HEAD WITH & W/O-POST PROCESS   Final Result         1.  CT angiogram of the [place] within normal limits.          CT-CTA NECK WITH & W/O-POST PROCESSING   Final Result         1.  4.2 cm ascending thoracic aortic aneurysm, radiographic follow-up and surveillance recommended as clinically appropriate.   2.  Otherwise unremarkable CT angiogram of the neck.      These findings were discussed with the patient's clinician, ANDREIA GIRON, on 10/17/2019 9:37 PM.           Assessment/Plan  * CVA (cerebral vascular accident) (HCC)- (present on admission)  Assessment & Plan  Right cerebellar infarct in the area of the posterior inferior cerebellar artery presenting 10/17/2019  Started aspirin   High-intensity statin  Echocardiogram negative for clot  Continuous telemetry monitoring to eval for atrial fibrillation  Neurosurgery consulted  Neurology consulted  Blood pressure control  PT/OT/SLP   3% drip turned off on 10/23  Check BMP in the morning to follow sodium  Cardiology eval prior to d/c for continuous monitoring for possible atrial fibrillation     Neck pain- (present on admission)  Assessment & Plan  Secondary to cerebral infarct, follow    Headache- (present on admission)  Assessment & Plan  Secondary to CVA, symptomatic treatment  Cerebral spinal fluid was negative for meningitis/encephalitis     Essential hypertension- (present on admission)  Assessment & Plan  Keep systolic blood pressure less than 140 as per neurology  PRN IV Enalapril, hydralazine, and labetalol  Continue norvasc 10 mg daily home medication  Requiring an IV nicardipine drip thus ICU  Increase lisinopril from 10 mg today to 20 daily     Glaucoma- (present on admission)  Assessment & Plan  Continue current outpatient regimen.    No new blurriness.       VTE prophylaxis: SCDs

## 2019-10-23 NOTE — PROGRESS NOTES
[facility] Neurology Progress Note:     CC: Headache     HPI: 69 y.o. male w/ PMHx of HTN who presented 10/17/2019 with headache and right sided ataxia. CT/CTA head and neck negative, MRI completed 10/19 revealing right cerebellar infarction with some compression 4th ventricle, thus neurology was consulted.     Interval History:   No changes in neurological status   Off hypertonic saline, Na normal     Subjective:   Feeling back to baseline, wants to go home, no new complaints.     Objective:   Vitals:    10/23/19 0700 10/23/19 0800 10/23/19 0900 10/23/19 1000   BP: 131/83 134/79 156/92 111/68   Pulse: 71 60 81 63   Resp:       Temp:  37.3 °C (99.1 °F)     TempSrc:  Temporal     SpO2: 92% 92% 92% 93%   Weight:  79.4 kg (175 lb 0.7 oz)     Height:           Labs:     Lab Results   Component Value Date/Time    PROTHROMBTM 14.3 10/18/2019 07:15 AM    INR 1.09 10/18/2019 07:15 AM      Lab Results   Component Value Date/Time    WBC 7.2 10/23/2019 05:30 AM    RBC 5.02 10/23/2019 05:30 AM    HEMOGLOBIN 15.8 10/23/2019 05:30 AM    HEMATOCRIT 46.3 10/23/2019 05:30 AM    MCV 92.2 10/23/2019 05:30 AM    MCH 31.5 10/23/2019 05:30 AM    MCHC 34.1 10/23/2019 05:30 AM    MPV 10.3 10/23/2019 05:30 AM    NEUTSPOLYS 60.50 10/23/2019 05:30 AM    LYMPHOCYTES 26.40 10/23/2019 05:30 AM    MONOCYTES 11.00 10/23/2019 05:30 AM    EOSINOPHILS 1.10 10/23/2019 05:30 AM    BASOPHILS 0.60 10/23/2019 05:30 AM      Lab Results   Component Value Date/Time    SODIUM 144 10/23/2019 05:30 AM    POTASSIUM 3.8 10/23/2019 05:30 AM    CHLORIDE 111 10/23/2019 05:30 AM    CO2 27 10/23/2019 05:30 AM    GLUCOSE 96 10/23/2019 05:30 AM    BUN 9 10/23/2019 05:30 AM    CREATININE 0.62 10/23/2019 05:30 AM      Lab Results   Component Value Date/Time    CHOLSTRLTOT 204 (H) 10/21/2019 05:25 AM     (H) 10/21/2019 05:25 AM    HDL 41 10/21/2019 05:25 AM    TRIGLYCERIDE 63 10/21/2019 05:25 AM       Lab Results   Component Value Date/Time    ALKPHOSPHAT 36  10/23/2019 05:30 AM    ASTSGOT 14 10/23/2019 05:30 AM    ALTSGPT 15 10/23/2019 05:30 AM    TBILIRUBIN 1.1 10/23/2019 05:30 AM        Imaging/Testing:   CT-HEAD W/O   Final Result         1.  Low-density changes in the right cerebellar hemisphere, appearance compatible with evolving infarct.   2.  Otherwise nonspecific white matter changes, commonly associated with small vessel ischemic disease.  Associated mild cerebral atrophy is noted.   3.  Left maxillary sinusitis changes   4.  Atherosclerosis.      EC-ECHOCARDIOGRAM COMPLETE W/O CONT   Final Result      DX-CHEST-FOR LINE PLACEMENT Perform procedure in: Patient's Room   Final Result      Right IJ central catheter is well-positioned. No pneumothorax.      MR-BRAIN-WITH & W/O   Final Result         1.  Moderately large acute area of infarction involving the right hemicerebellum inferiorly and adjacent cerebellar vermis in the distribution of the posterior inferior cerebellar artery.      2.  Age-related cerebral atrophy.      3.  Mild periventricular white matter changes consistent with chronic microvascular ischemic gliosis.      DX-CHEST-2 VIEWS   Final Result      No acute cardiopulmonary abnormality identified.      MR-CERVICAL SPINE-W/O   Final Result         1.  Moderate to severe discal and endplate degenerative changes from the C3-4 level through the C5-6 level with mild marginal osteophytosis.      2.  Minimal to mild multilevel cervical spondylotic change from the C3-4 level through the C6-7 level most pronounced at the C4-5 level.      3.  Moderate to severe multilevel neural foraminal narrowing.      DX-LUMBAR PUNCTURE FOR DIAGNOSIS   Final Result      Fluoroscopic-guided lumbar puncture as described above.      CT-CTA HEAD WITH & W/O-POST PROCESS   Final Result         1.  CT angiogram of the [place] within normal limits.         CT-CTA NECK WITH & W/O-POST PROCESSING   Final Result         1.  4.2 cm ascending thoracic aortic aneurysm,  radiographic follow-up and surveillance recommended as clinically appropriate.   2.  Otherwise unremarkable CT angiogram of the neck.      These findings were discussed with the patient's clinician, ANDREIA GIRON, on 10/17/2019 9:37 PM.            Physical Exam:     General: non-toxic appearing male, sitting up resting comfortably   Cardio: RRR, Normal S1/S2. No peripheral edema.   Pulm: CTAX2. No respiratory distress.   Skin: Warm, dry, no rashes or lesions  Psychiatric: flat affect. No active psychosis.  HEENT: Atraumatic head, normal sclera and conjunctiva, moist oral mucosa.  Abdomen: Soft, non tender. No masses or hepatosplenomegaly.    Neurologic:  Mental Status: awake and alert and fully oriented to time, place, situation AAOx4. Able to follow commands/cross midline. Speech fluent/nondysarthric. Language functions intact. No neglect/apraxia.  Cranial Nerves: PERRL. EOMi. + hypometric saccades. Face symmetric, palate/tongue midline. Visual fields full to confrontation. Facial sensation intact.   Motor:  Normal muscle tone and bulk. Strength is 5/5 throughout. No abnormal movements.  Reflexes: 2/4 throughout. Flexor plantar responses bilaterally.  Coordination: finger/nose intact, very  Mild dysmetria on right finger erasto improved from previous exam   Sensation: sensation intact in all 4   Gait/Station: deferred     Assessment/Plan:    Yousif Jauregui is a 69 y.o. male with relevant history of HTN and HLD presenting for whom neurology was consulted to address right sided ataxia consistent with the infarct in the right cerebellum.  Patient has been managed on hypertonic saline for compression of the 4th ventricle.  Underlying etiology of the stroke likely related to the ascending aortic aneurism vs. cardioembolic phenomenon.     Plan:  - Neurology checks and vital signs per protocol  - goal BP <140 systolic   - secondary stroke prevention therapy with ASA 325mg qd and high intensity statin   - recommend set up  cardiac monitoring device for at least 30 days with MCOT/Loop/Zio patch prior to discharge   - neurology to sign off, please re-consult for further questions or concerns     Daphney Douglass M.D.   PGY 3 [facility] Internal Medicine   Neurology     ................    The patient was seen and examined. I agree with plan above; see any corrections below:    I saw and evaluated the patient and discussed the management with the primary medical team, nursing, and resident staff. I reviewed Dr. Daphney Douglass note and agree with their findings and plan as documented in the note except as documented below. The chart was reviewed and summarized.  As listed in reviewed note any/all available labs, imaging, vitals were reviewed and neuroimaging visualized. Available nursing, consultant, and resident notes were reviewed.    Roshan Handy MD  Director, [facility], [facility]  Neurohospitalist, [facility]  Clinical  of Neurology, [facility] [employer]  t) [phone] (f) [phone]    Referring Provider-  Tyrone Rucker M.D.    CRITICAL CARE    Upon my evaluation, this patient had a high probability of imminent or life-threatening deterioration due to cerebrovascular accident which required my direct attention, intervention, and personal management.  I personally provided 35 minutes of total critical care time outside of time spent on separately billable/documented procedures. Time includes: review of laboratory data, review of radiology studies, discussion with consultants, discussion with family/patient, monitoring for potential decompensation.  Interventions were performed as documented in the chart.

## 2019-10-23 NOTE — CARE PLAN
Problem: Communication  Goal: The ability to communicate needs accurately and effectively will improve  Outcome: PROGRESSING AS EXPECTED     Problem: Safety  Goal: Will remain free from falls  Outcome: PROGRESSING AS EXPECTED  Goal: Will remain free from injury  Outcome: PROGRESSING AS EXPECTED     Problem: Knowledge Deficit  Goal: Knowledge of the prescribed therapeutic regimen will improve  Outcome: PROGRESSING AS EXPECTED  Goal: Knowledge of disease process/condition, treatment plan, diagnostic tests, and medications will improve  Outcome: PROGRESSING AS EXPECTED     Problem: Discharge Barriers/Planning  Goal: Patient's continuum of care needs will be met  Outcome: PROGRESSING AS EXPECTED     Problem: Infection  Goal: Will remain free from infection  Outcome: PROGRESSING AS EXPECTED     Problem: Safety  Goal: Free from accidental injury  Outcome: PROGRESSING AS EXPECTED  Goal: Isolation Precautions for patient and staff safety  Outcome: PROGRESSING AS EXPECTED     Problem: Knowledge Deficit  Goal: Patient/Significant other demonstrates understanding of disease process, treatment plan, medications and discharge instructions  Outcome: PROGRESSING AS EXPECTED     Problem: Psychosocial needs  Goal: Spiritual needs incorporated in hospitalization  Outcome: PROGRESSING AS EXPECTED  Goal: Anxiety reduction  Outcome: PROGRESSING AS EXPECTED     Problem: Neuro Status  Goal: Monitor neuro status and rapid identification of neuro changes  Outcome: PROGRESSING AS EXPECTED     Problem: Risk for Impaired Mobility  Goal: Mobilize and/or Transfer Safely with [facility] [place]  Outcome: PROGRESSING AS EXPECTED  Goal: Activity Level appropriate for Discharge or Transfer  Outcome: PROGRESSING AS EXPECTED     Problem: Self Care Deficit  Goal: Ability to bathe, groom and dress independently or with assistance  Outcome: PROGRESSING AS EXPECTED  Goal: Ability to feed and maintain oral hygiene independently or with  assistance  Outcome: PROGRESSING AS EXPECTED  Goal: Ability to toilet independently or with assistance  Outcome: PROGRESSING AS EXPECTED     Problem: Pain Management  Goal: Ability to identify factors that increase the pain will improve  Outcome: PROGRESSING AS EXPECTED  Goal: Ability to notify healthcare provider of pain before it becomes unmanageable or unbearable will improve  Outcome: PROGRESSING AS EXPECTED  Goal: Patient's satisfaction with pain management regimen will improve  Outcome: PROGRESSING AS EXPECTED  Goal: Pain level will decrease to patient's comfort goal  Outcome: PROGRESSING AS EXPECTED     Problem: Fluid Volume:  Goal: Maintenance of adequate hydration will improve  Outcome: PROGRESSING AS EXPECTED  Goal: Will maintain balanced intake and output  Outcome: PROGRESSING AS EXPECTED     Problem: Physical Regulation:  Goal: Complications related to the disease process, condition or treatment will be avoided or minimized  Outcome: PROGRESSING AS EXPECTED     Problem: Health Behavior:  Goal: Ability to state signs and symptoms to report to health care provider will improve  Outcome: PROGRESSING AS EXPECTED     Problem: Venous Thromboembolism (VTW)/Deep Vein Thrombosis (DVT) Prevention:  Goal: Patient will participate in Venous Thrombosis (VTE)/Deep Vein Thrombosis (DVT)Prevention Measures  Outcome: PROGRESSING AS EXPECTED     Problem: Bowel/Gastric:  Goal: Normal bowel function is maintained or improved  Outcome: PROGRESSING AS EXPECTED  Goal: Will not experience complications related to bowel motility  Outcome: PROGRESSING AS EXPECTED     Problem: Respiratory:  Goal: Respiratory status will improve  Outcome: PROGRESSING AS EXPECTED     Problem: Mobility  Goal: Risk for activity intolerance will decrease  Outcome: PROGRESSING AS EXPECTED

## 2019-10-24 ENCOUNTER — APPOINTMENT (OUTPATIENT)
Dept: RADIOLOGY | Facility: MEDICAL CENTER | Age: 69
DRG: 064 | End: 2019-10-24
Attending: HOSPITALIST
Payer: MEDICARE

## 2019-10-24 LAB
ANION GAP SERPL CALC-SCNC: 7 MMOL/L (ref 0–11.9)
BUN SERPL-MCNC: 13 MG/DL (ref 8–22)
CALCIUM SERPL-MCNC: 8 MG/DL (ref 8.5–10.5)
CHLORIDE SERPL-SCNC: 105 MMOL/L (ref 96–112)
CO2 SERPL-SCNC: 28 MMOL/L (ref 20–33)
CREAT SERPL-MCNC: 0.61 MG/DL (ref 0.5–1.4)
GLUCOSE BLD-MCNC: 108 MG/DL (ref 65–99)
GLUCOSE SERPL-MCNC: 103 MG/DL (ref 65–99)
POTASSIUM SERPL-SCNC: 3.8 MMOL/L (ref 3.6–5.5)
SODIUM SERPL-SCNC: 140 MMOL/L (ref 135–145)

## 2019-10-24 PROCEDURE — A9270 NON-COVERED ITEM OR SERVICE: HCPCS | Performed by: FAMILY MEDICINE

## 2019-10-24 PROCEDURE — 770001 HCHG ROOM/CARE - MED/SURG/GYN PRIV*

## 2019-10-24 PROCEDURE — 80048 BASIC METABOLIC PNL TOTAL CA: CPT

## 2019-10-24 PROCEDURE — 99221 1ST HOSP IP/OBS SF/LOW 40: CPT | Performed by: INTERNAL MEDICINE

## 2019-10-24 PROCEDURE — 70450 CT HEAD/BRAIN W/O DYE: CPT

## 2019-10-24 PROCEDURE — 99233 SBSQ HOSP IP/OBS HIGH 50: CPT | Performed by: HOSPITALIST

## 2019-10-24 PROCEDURE — 700102 HCHG RX REV CODE 250 W/ 637 OVERRIDE(OP): Performed by: INTERNAL MEDICINE

## 2019-10-24 PROCEDURE — 700111 HCHG RX REV CODE 636 W/ 250 OVERRIDE (IP): Performed by: HOSPITALIST

## 2019-10-24 PROCEDURE — 97535 SELF CARE MNGMENT TRAINING: CPT

## 2019-10-24 PROCEDURE — 97530 THERAPEUTIC ACTIVITIES: CPT

## 2019-10-24 PROCEDURE — 82962 GLUCOSE BLOOD TEST: CPT

## 2019-10-24 PROCEDURE — 700102 HCHG RX REV CODE 250 W/ 637 OVERRIDE(OP): Performed by: HOSPITALIST

## 2019-10-24 PROCEDURE — A9270 NON-COVERED ITEM OR SERVICE: HCPCS | Performed by: INTERNAL MEDICINE

## 2019-10-24 PROCEDURE — 700111 HCHG RX REV CODE 636 W/ 250 OVERRIDE (IP): Performed by: NURSE PRACTITIONER

## 2019-10-24 PROCEDURE — 700102 HCHG RX REV CODE 250 W/ 637 OVERRIDE(OP): Performed by: FAMILY MEDICINE

## 2019-10-24 PROCEDURE — A9270 NON-COVERED ITEM OR SERVICE: HCPCS | Performed by: HOSPITALIST

## 2019-10-24 RX ORDER — POTASSIUM CHLORIDE 20 MEQ/1
40 TABLET, EXTENDED RELEASE ORAL ONCE
Status: COMPLETED | OUTPATIENT
Start: 2019-10-24 | End: 2019-10-24

## 2019-10-24 RX ADMIN — HEPARIN SODIUM 5000 UNITS: 5000 INJECTION, SOLUTION INTRAVENOUS; SUBCUTANEOUS at 21:08

## 2019-10-24 RX ADMIN — AMLODIPINE BESYLATE 10 MG: 10 TABLET ORAL at 05:30

## 2019-10-24 RX ADMIN — HEPARIN SODIUM 5000 UNITS: 5000 INJECTION, SOLUTION INTRAVENOUS; SUBCUTANEOUS at 05:31

## 2019-10-24 RX ADMIN — HEPARIN SODIUM 5000 UNITS: 5000 INJECTION, SOLUTION INTRAVENOUS; SUBCUTANEOUS at 14:22

## 2019-10-24 RX ADMIN — LABETALOL HYDROCHLORIDE 10 MG: 5 INJECTION INTRAVENOUS at 03:04

## 2019-10-24 RX ADMIN — ATORVASTATIN CALCIUM 80 MG: 40 TABLET, FILM COATED ORAL at 21:08

## 2019-10-24 RX ADMIN — ASPIRIN 81 MG 324 MG: 81 TABLET ORAL at 05:30

## 2019-10-24 RX ADMIN — HYDRALAZINE HYDROCHLORIDE 20 MG: 20 INJECTION INTRAMUSCULAR; INTRAVENOUS at 06:33

## 2019-10-24 RX ADMIN — LISINOPRIL 20 MG: 20 TABLET ORAL at 05:30

## 2019-10-24 RX ADMIN — ONDANSETRON 4 MG: 2 INJECTION INTRAMUSCULAR; INTRAVENOUS at 08:58

## 2019-10-24 RX ADMIN — LATANOPROST 2 DROP: 50 SOLUTION OPHTHALMIC at 21:08

## 2019-10-24 RX ADMIN — TIMOLOL MALEATE 1 DROP: 5 SOLUTION OPHTHALMIC at 05:30

## 2019-10-24 RX ADMIN — POTASSIUM CHLORIDE 40 MEQ: 1500 TABLET, EXTENDED RELEASE ORAL at 08:21

## 2019-10-24 ASSESSMENT — GAIT ASSESSMENTS
GAIT LEVEL OF ASSIST: SUPERVISED
DISTANCE (FEET): 200
ASSISTIVE DEVICE: FRONT WHEEL WALKER
DEVIATION: OTHER (COMMENT)

## 2019-10-24 ASSESSMENT — COGNITIVE AND FUNCTIONAL STATUS - GENERAL
CLIMB 3 TO 5 STEPS WITH RAILING: A LITTLE
SUGGESTED CMS G CODE MODIFIER MOBILITY: CI
MOBILITY SCORE: 23

## 2019-10-24 ASSESSMENT — ENCOUNTER SYMPTOMS
FEVER: 0
SPEECH CHANGE: 0
NAUSEA: 1
DIZZINESS: 1
HEADACHES: 0
SENSORY CHANGE: 0
SHORTNESS OF BREATH: 0
COUGH: 0

## 2019-10-24 NOTE — PROGRESS NOTES
Pt has complaint of being dizzy and nauseated, IV zofran given but pt states that after it was given he got more dizzy and nauseated.  Dr. March notified.

## 2019-10-24 NOTE — PROGRESS NOTES
[facility] Medicine Daily Progress Note    Date of Service  10/24/2019    Chief Complaint  69 y.o. male admitted 10/17/2019 with headache.    Hospital Course    Mr. Jauregui has a past medical history of hypertension was in his usual state of health until 10/17/2019 he developed acute onset of headache and neck pain therefore presented to the emergency room.  He presented to the emergency room in [facility] where there was suspicion for subarachnoid hemorrhage therefore was transferred here for higher level of care.  He was found to have a right cerebellar ischemic infarct has been admitted to the intensive care unit.      Interval Problem Update  10/21 patient found with significant right cerebella infarction, significant edema with possible compression of the fourth ventricle.  Transferred to ICU, discussed with neurosurgery and neurology.  No need for surgical intervention.  Patient feels improved, less posterior headache, heart rate in 50s to 70s, blood pressure 120-140.  Afebrile, follow-up CT head shows continued low-density changes otherwise no acute abnormalities or hemorrhage  10/22: Patient seen and evaluated in the intensive care unit. He is on a 3% drip at 90 ml/hour. He is not able to tolerate salt tabs due to nausea. 2.5 liters of urine over night. He remains on a nicardipine drip due to elevated blood pressure. His wife is at bedside. He takes norvasc 10 mg daily and we discussed that he will require more medications.   10/23: Mr. Jauregui was evaluated and examined in the ICU. He was given IV hydralazine, IV labetalol, and IV enalapril over night due to elevated blood pressures. He is on IV 3% drip at 25 ml/hour in the morning which was turned off. He has been ambulating and tolerating a diet. Mr. Jauregui wants to do rehab in [place].   10/24: patient seen and evaluated in the ICU. He complains of nausea, dizziness, and neck pain that all started while having a bowel movement. A stat CT  head has been ordered. He does not have vision changes nor headach.   Consultants/Specialty  Critical care. I discussed with Dr. Barragan on ICU Hot Rounds.  Neurosurgery  Neurology   Code Status  Full     Disposition  neurosurgery.    Review of Systems  Review of Systems   Constitutional: Negative for fever.   Respiratory: Negative for cough and shortness of breath.    Cardiovascular: Negative for chest pain.   Gastrointestinal: Positive for nausea.   Neurological: Positive for dizziness. Negative for sensory change, speech change and headaches.        Neck pain   All other systems reviewed and are negative.       Physical Exam  Temp:  [36.9 °C (98.4 °F)-37.3 °C (99.2 °F)] 36.9 °C (98.4 °F)  Pulse:  [56-81] 61  Resp:  [12-16] 12  BP: (110-166)/(57-95) 145/90  SpO2:  [90 %-100 %] 94 %    Physical Exam   Constitutional: He is oriented to person, place, and time. No distress.   HENT:   Dry mucous membranes   Eyes: Right eye exhibits no discharge. Left eye exhibits no discharge. No scleral icterus.   Neck:   Right IJ central line   Cardiovascular: Normal rate and regular rhythm.   No murmur heard.  Pulmonary/Chest: Effort normal. No respiratory distress. He has no wheezes.   Abdominal: Soft. He exhibits no distension. There is no tenderness.   Musculoskeletal: He exhibits no edema or tenderness.   Neurological: He is alert and oriented to person, place, and time.   5/5 strength bilateral upper and lower extremities.   No facial droop   Skin: Skin is warm and dry. He is not diaphoretic.   Psychiatric: He has a normal mood and affect. His behavior is normal.   Normal speech   Nursing note and vitals reviewed.      Fluids    Intake/Output Summary (Last 24 hours) at 10/24/2019 0715  Last data filed at 10/24/2019 0400  Gross per 24 hour   Intake 948.75 ml   Output 1375 ml   Net -426.25 ml       Laboratory  Recent Labs     10/22/19  0545 10/23/19  0530   WBC 9.0 7.2   RBC 5.33 5.02   HEMOGLOBIN 17.2 15.8   HEMATOCRIT 47.8 46.3    MCV 89.7 92.2   MCH 32.3 31.5   MCHC 36.0* 34.1   RDW 39.9 41.3   PLATELETCT 181 182   MPV 10.0 10.3     Recent Labs     10/23/19  0530 10/23/19  1348 10/24/19  0310   SODIUM 144 140 140   POTASSIUM 3.8 3.8 3.8   CHLORIDE 111 108 105   CO2 27 27 28   GLUCOSE 96 79 103*   BUN 9 9 13   CREATININE 0.62 0.54 0.61   CALCIUM 8.1* 8.2* 8.0*                   Imaging  CT-HEAD W/O   Final Result         1.  Low-density changes in the right cerebellar hemisphere, appearance compatible with evolving infarct.   2.  Otherwise nonspecific white matter changes, commonly associated with small vessel ischemic disease.  Associated mild cerebral atrophy is noted.   3.  Left maxillary sinusitis changes   4.  Atherosclerosis.      EC-ECHOCARDIOGRAM COMPLETE W/O CONT   Final Result      DX-CHEST-FOR LINE PLACEMENT Perform procedure in: Patient's Room   Final Result      Right IJ central catheter is well-positioned. No pneumothorax.      MR-BRAIN-WITH & W/O   Final Result         1.  Moderately large acute area of infarction involving the right hemicerebellum inferiorly and adjacent cerebellar vermis in the distribution of the posterior inferior cerebellar artery.      2.  Age-related cerebral atrophy.      3.  Mild periventricular white matter changes consistent with chronic microvascular ischemic gliosis.      DX-CHEST-2 VIEWS   Final Result      No acute cardiopulmonary abnormality identified.      MR-CERVICAL SPINE-W/O   Final Result         1.  Moderate to severe discal and endplate degenerative changes from the C3-4 level through the C5-6 level with mild marginal osteophytosis.      2.  Minimal to mild multilevel cervical spondylotic change from the C3-4 level through the C6-7 level most pronounced at the C4-5 level.      3.  Moderate to severe multilevel neural foraminal narrowing.      DX-LUMBAR PUNCTURE FOR DIAGNOSIS   Final Result      Fluoroscopic-guided lumbar puncture as described above.      CT-CTA HEAD WITH & W/O-POST  PROCESS   Final Result         1.  CT angiogram of the [place] within normal limits.         CT-CTA NECK WITH & W/O-POST PROCESSING   Final Result         1.  4.2 cm ascending thoracic aortic aneurysm, radiographic follow-up and surveillance recommended as clinically appropriate.   2.  Otherwise unremarkable CT angiogram of the neck.      These findings were discussed with the patient's clinician, ANDREIA GIRON, on 10/17/2019 9:37 PM.           Assessment/Plan  * CVA (cerebral vascular accident) (HCC)- (present on admission)  Assessment & Plan  Right cerebellar infarct in the area of the posterior inferior cerebellar artery presenting 10/17/2019  Started aspirin   High-intensity statin  Echocardiogram negative for clot  Continuous telemetry monitoring to eval for atrial fibrillation  Neurosurgery consulted  Neurology consulted  Blood pressure control  PT/OT/SLP   3% saline drip turned off on 10/23  Cardiology consult today for evaluation of a possible Loop recorder or other method of continuous monitoring for possible atrial fibrillation     Neck pain- (present on admission)  Assessment & Plan  Secondary to cerebral infarct  He has a re-occurrence of symptoms thus stat CT head ordered for possible hemorrhagic transformation    Headache- (present on admission)  Assessment & Plan  Secondary to CVA, symptomatic treatment  Cerebral spinal fluid was negative for meningitis/encephalitis     Essential hypertension- (present on admission)  Assessment & Plan  Keep systolic blood pressure less than 140 as per neurology  PRN IV Enalapril, hydralazine, and labetalol  Continue norvasc 10 mg daily home medication  He required an IV nicardipine drip thus ICU  Increased lisinopril from 10 mg today to 20 daily     Glaucoma- (present on admission)  Assessment & Plan  Continue current outpatient regimen.    No new blurriness.       VTE prophylaxis: SCDs       Improved

## 2019-10-24 NOTE — PROGRESS NOTES
Pulmonary/Critical Care Medicine   Progress Note    Date of service: 10/24/2019  Time: 6:52 AM    Patient’s neurological exam continues to improve. Sodium stable off of hypertonic saline. [facility] critical care will sign off at this time. Transfer orders have been placed. Continue with post-stroke care plan. Please contact with any questions.    DOC Jay Jr.O.  [facility] Critical Care

## 2019-10-24 NOTE — THERAPY
"Physical Therapy Treatment completed.   Bed Mobility:  Supine to Sit: Supervised  Transfers: Sit to Stand: Supervised  Gait: Level Of Assist: Supervised with Front-Wheel Walker       Plan of Care: Patient with no further skilled PT needs in the acute care setting at this time  Discharge Recommendations: Equipment: No Equipment Needed. Has walker at home per spouse/pt; see below    Pt progressing as expected. He and spouse report no functional concerns iwth dc to home and are planning to dc to home tomorrow. He was able to demonstrate hallway ambulation with FWW and Spv as well as short distance/transfers without AD. He reports having appropriate ADs at home and pt/spouse plans on outpatient v home PT after dc for optimal functional progression. WIll dc from acute services at this time.     See \"Rehab Therapy-Acute\" Patient Summary Report for complete documentation.       "

## 2019-10-24 NOTE — CARE PLAN
Problem: Risk for Impaired Mobility  Goal: Mobilize and/or Transfer Safely with [facility] [place]  Outcome: PROGRESSING AS EXPECTED  Note:   Pt up to wheelchair for CT scan, refusing ambulation at this time due to dizziness.     Problem: Acute Care of the Stroke Patient  Goal: Optimal Outcome for the Stroke patient  Outcome: PROGRESSING AS EXPECTED  Intervention: Vital Signs and Neuro Checks per order  Note:   VS & Neuro Checks Q4 hours  Intervention: NIHSS completed and documented  Note:   NIHSS=0  Intervention: Telemetry monitoring (discontinue after 24 hours unless contraindicated. If monitoring required beyond 24 hours obtain order for monitored bed)  Note:   Pt has orders to go to neuro with telemetry, pt to get loop recorder.

## 2019-10-24 NOTE — CONSULTS
Cardiology Consult Note:    Hemanth Singleton  Date & Time note created:    10/24/2019   4:24 PM     Referring MD:  Dr. Brown    Patient ID:   Name:             Yousif Jauregui     YOB: 1950  Age:                 69 y.o.  male   MRN:               [id number]                                                             Reason for Consult:      CVA    History of Present Illness:    61-year-old male with no identifiable risk factors with exception of hypertension was admitted to the hospital for a CVA.  He underwent an echocardiogram which was unremarkable.  He is monitored on telemetry with no findings.  He had a CT angiogram that showed no obstructive disease in his carotids.  He denies a past medical history of A. fib palpitations syncope shortness of breath.  He is now doing sigmoid better and ready for discharge.  The primary team is asked for a event recorder/implantable recorder.    Review of Systems:      Constitutional: Denies fevers, Denies weight changes  Eyes: Denies changes in vision, no eye pain  Ears/Nose/Throat/Mouth: Denies nasal congestion or sore throat   Cardiovascular: no chest pain, no palpitations   Respiratory: no shortness of breath , Denies cough  Gastrointestinal/Hepatic: Denies abdominal pain, nausea, vomiting, diarrhea, constipation or GI bleeding   Genitourinary: Denies dysuria or frequency  Musculoskeletal/Rheum: Denies  joint pain and swelling, noedema  Skin: Denies rash  Neurological: Denies headache, confusion, memory loss or focal weakness/parasthesias  Psychiatric: denies mood disorder   Endocrine: [place] thyroid problems  Heme/Oncology/Lymph Nodes: Denies enlarged lymph nodes, denies brusing or known bleeding disorder  All other systems were reviewed and are negative (AMA/CMS criteria)                Past Medical History:   Past Medical History:   Diagnosis Date   • Hypertension      Active Hospital Problems    Diagnosis   • CVA (cerebral vascular accident) (HCC)  [I63.9]     Priority: High   • Dizziness [R42]     Priority: High   • Headache [R51]     Priority: High   • Neck pain [M54.2]     Priority: High   • Essential hypertension [I10]     Priority: Medium   • Glaucoma [H40.9]     Priority: Low       Past Surgical History:  History reviewed. No pertinent surgical history.    Hospital Medications:  Current Facility-Administered Medications   Medication Dose   • lisinopril (PRINIVIL) tablet 20 mg  20 mg   • heparin injection 5,000 Units  5,000 Units   • aspirin (ASA) chewable tab 324 mg  324 mg   • enalaprilat (VASOTEC) injection 1.25 mg  1.25 mg   • labetalol (NORMODYNE,TRANDATE) injection 10 mg  10 mg   • atorvastatin (LIPITOR) tablet 80 mg  80 mg   • MD Alert...ICU Electrolyte Replacement per Pharmacy     • amLODIPine (NORVASC) tablet 10 mg  10 mg   • hydrALAZINE (APRESOLINE) injection 10-20 mg  10-20 mg   • latanoprost (XALATAN) 0.005 % ophthalmic solution 2 Drop  2 Drop   • timolol (TIMOPTIC) 0.5 % ophthalmic solution 1 Drop  1 Drop   • Pharmacy Consult Request ...Pain Management Review 1 Each  1 Each   • ondansetron (ZOFRAN) syringe/vial injection 4 mg  4 mg   • ondansetron (ZOFRAN ODT) dispertab 4 mg  4 mg   • senna-docusate (PERICOLACE or SENOKOT S) 8.6-50 MG per tablet 2 Tab  2 Tab    And   • polyethylene glycol/lytes (MIRALAX) PACKET 1 Packet  1 Packet    And   • magnesium hydroxide (MILK OF MAGNESIA) suspension 30 mL  30 mL    And   • bisacodyl (DULCOLAX) suppository 10 mg  10 mg         Current Outpatient Medications:  Medications Prior to Admission   Medication Sig Dispense Refill Last Dose   • amLODIPine (NORVASC) 10 MG Tab Take 10 mg by mouth every day.   10/17/2019 at am   • latanoprost (XALATAN) 0.005 % Solution Place 2 Drops in both eyes every evening.   10/16/2019 at pm   • timolol (TIMOPTIC) 0.5 % Solution Place 1 Drop in right eye every morning.   10/17/2019 at am       Medication Allergy:  No Known Allergies    Family History:  Family History   Problem  "Relation Age of Onset   • No Known Problems Mother    • No Known Problems Father        Social History:  Social History     Socioeconomic History   • Marital status:      Spouse name: Not on file   • Number of children: Not on file   • Years of education: Not on file   • Highest education level: Not on file   Occupational History   • Not on file   Social Needs   • Financial resource strain: Not on file   • Food insecurity:     Worry: Not on file     Inability: Not on file   • Transportation needs:     Medical: Not on file     Non-medical: Not on file   Tobacco Use   • Smoking status: Never Smoker   • Smokeless tobacco: Never Used   Substance and Sexual Activity   • Alcohol use: Yes     Frequency: 2-3 times a week   • Drug use: Never   • Sexual activity: Not on file   Lifestyle   • Physical activity:     Days per week: Not on file     Minutes per session: Not on file   • Stress: Not on file   Relationships   • Social connections:     Talks on phone: Not on file     Gets together: Not on file     Attends Restorationist service: Not on file     Active member of club or organization: Not on file     Attends meetings of clubs or organizations: Not on file     Relationship status: Not on file   • Intimate partner violence:     Fear of current or ex partner: Not on file     Emotionally abused: Not on file     Physically abused: Not on file     Forced sexual activity: Not on file   Other Topics Concern   • Not on file   Social History Narrative   • Not on file         Physical Exam:  Vitals/ General Appearance:   Weight/BMI: Body mass index is 24.42 kg/m².  /80   Pulse 65   Temp 37.2 °C (99 °F) (Temporal)   Resp 16   Ht 1.803 m (5' 10.98\")   Wt 79.4 kg (175 lb 0.7 oz)   SpO2 95%   Vitals:    10/24/19 1301 10/24/19 1400 10/24/19 1500 10/24/19 1600   BP:  122/74 127/76 128/80   Pulse:  62 62 65   Resp:  (!) 22  16   Temp:    37.2 °C (99 °F)   TempSrc:    Temporal   SpO2: 94% 94% 94% 95%   Weight:       Height:   "       Oxygen Therapy:  Pulse Oximetry: 95 %, O2 (LPM): 2, O2 Delivery: Silicone Nasal Cannula    Constitutional:   Well developed, Well nourished, No acute distress  HENMT:  Normocephalic, Atraumatic, Oropharynx moist mucous membranes, No oral exudates, Nose normal.  No thyromegaly.  Eyes:  EOMI, Conjunctiva normal, No discharge.  Neck:  Normal range of motion, No cervical tenderness,  no JVD.  Cardiovascular:  Normal heart rate, Normal rhythm, No murmurs, No rubs, No gallops.   Extremitites with intact distal pulses, no cyanosis, or edema.  Lungs:  Normal breath sounds, breath sounds clear to auscultation bilaterally,  no crackles, no wheezing.   Abdomen: Bowel sounds normal, Soft, No tenderness, No guarding, No rebound, No masses, No hepatosplenomegaly.  Skin: Warm, Dry, No erythema, No rash, no induration.  Neurologic: Alert & oriented x 3, No focal deficits noted, cranial nerves II through X are grossly intact.  Psychiatric: Affect normal, Judgment normal, Mood normal.      MDM (Data Review):     Records reviewed and summarized in current documentation    Lab Data Review:  Recent Results (from the past 24 hour(s))   Basic Metabolic Panel    Collection Time: 10/24/19  3:10 AM   Result Value Ref Range    Sodium 140 135 - 145 mmol/L    Potassium 3.8 3.6 - 5.5 mmol/L    Chloride 105 96 - 112 mmol/L    Co2 28 20 - 33 mmol/L    Glucose 103 (H) 65 - 99 mg/dL    Bun 13 8 - 22 mg/dL    Creatinine 0.61 0.50 - 1.40 mg/dL    Calcium 8.0 (L) 8.5 - 10.5 mg/dL    Anion Gap 7.0 0.0 - 11.9   ESTIMATED GFR    Collection Time: 10/24/19  3:10 AM   Result Value Ref Range    GFR If African American >60 >60 mL/min/1.73 m 2    GFR If Non African American >60 >60 mL/min/1.73 m 2   ACCU-CHEK GLUCOSE    Collection Time: 10/24/19  9:13 AM   Result Value Ref Range    Glucose - Accu-Ck 108 (H) 65 - 99 mg/dL       Imaging/Procedures Review:    Chest Xray:  Reviewed    EKG:   As in HPI.     ECHO:    CONCLUSIONS  Normal left ventricular  systolic function.  Left ventricular ejection fraction is visually estimated to be 65%.  Mild concentric left ventricular hypertrophy.  Mildly dilated right ventricle.  No significant valvular regurgitation or stenosis.   Normal estimated right atrial pressure.   Unable to estimate pulmonary artery pressure due to an inadequate   tricuspid regurgitant jet.    MDM (Assessment and Plan):     Active Hospital Problems    Diagnosis   • CVA (cerebral vascular accident) (HCC) [I63.9]     Priority: High   • Dizziness [R42]     Priority: High   • Headache [R51]     Priority: High   • Neck pain [M54.2]     Priority: High   • Essential hypertension [I10]     Priority: Medium   • Glaucoma [H40.9]     Priority: Low     61-year-old male with recent CVA with no findings to suggest an etiology.  We will make him n.p.o. after midnight for implantable recorder placement in the morning.

## 2019-10-24 NOTE — CARE PLAN
Problem: Safety  Goal: Will remain free from injury  Outcome: PROGRESSING AS EXPECTED     Problem: Infection  Goal: Will remain free from infection  Outcome: PROGRESSING AS EXPECTED     Problem: Safety  Goal: Free from accidental injury  Outcome: PROGRESSING AS EXPECTED     Problem: Neuro Status  Goal: Monitor neuro status and rapid identification of neuro changes  Outcome: PROGRESSING AS EXPECTED     Problem: Risk for Impaired Mobility  Goal: Mobilize and/or Transfer Safely with [facility] [place]  Outcome: PROGRESSING AS EXPECTED     Problem: Self Care Deficit  Goal: Ability to toilet independently or with assistance  Outcome: PROGRESSING AS EXPECTED     Problem: Fluid Volume:  Goal: Maintenance of adequate hydration will improve  Outcome: PROGRESSING AS EXPECTED

## 2019-10-25 VITALS
WEIGHT: 175.04 LBS | HEIGHT: 71 IN | SYSTOLIC BLOOD PRESSURE: 116 MMHG | TEMPERATURE: 98.5 F | RESPIRATION RATE: 20 BRPM | DIASTOLIC BLOOD PRESSURE: 76 MMHG | HEART RATE: 73 BPM | OXYGEN SATURATION: 92 % | BODY MASS INDEX: 24.51 KG/M2

## 2019-10-25 PROCEDURE — 700102 HCHG RX REV CODE 250 W/ 637 OVERRIDE(OP): Performed by: HOSPITALIST

## 2019-10-25 PROCEDURE — 700102 HCHG RX REV CODE 250 W/ 637 OVERRIDE(OP): Performed by: INTERNAL MEDICINE

## 2019-10-25 PROCEDURE — A9270 NON-COVERED ITEM OR SERVICE: HCPCS | Performed by: INTERNAL MEDICINE

## 2019-10-25 PROCEDURE — 700111 HCHG RX REV CODE 636 W/ 250 OVERRIDE (IP): Performed by: NURSE PRACTITIONER

## 2019-10-25 PROCEDURE — A9270 NON-COVERED ITEM OR SERVICE: HCPCS | Performed by: HOSPITALIST

## 2019-10-25 PROCEDURE — 99239 HOSP IP/OBS DSCHRG MGMT >30: CPT | Performed by: HOSPITALIST

## 2019-10-25 RX ORDER — ATORVASTATIN CALCIUM 40 MG/1
40 TABLET, FILM COATED ORAL DAILY
Qty: 30 TAB | Refills: 3 | Status: SHIPPED | OUTPATIENT
Start: 2019-10-25

## 2019-10-25 RX ORDER — LISINOPRIL 20 MG/1
20 TABLET ORAL DAILY
Qty: 30 TAB | Refills: 3 | Status: SHIPPED | OUTPATIENT
Start: 2019-10-26

## 2019-10-25 RX ADMIN — AMLODIPINE BESYLATE 10 MG: 10 TABLET ORAL at 05:05

## 2019-10-25 RX ADMIN — ASPIRIN 81 MG 324 MG: 81 TABLET ORAL at 05:05

## 2019-10-25 RX ADMIN — LISINOPRIL 20 MG: 20 TABLET ORAL at 05:05

## 2019-10-25 RX ADMIN — HEPARIN SODIUM 5000 UNITS: 5000 INJECTION, SOLUTION INTRAVENOUS; SUBCUTANEOUS at 05:06

## 2019-10-25 RX ADMIN — TIMOLOL MALEATE 1 DROP: 5 SOLUTION OPHTHALMIC at 05:06

## 2019-10-25 NOTE — PROGRESS NOTES
Handoff received from previous nurse Georgette at the patients bedside. Patient was resting comfortably and the bed was in a low and locked position. Call light in reach. Needs met at this time.

## 2019-10-25 NOTE — PROGRESS NOTES
Pt Discharged. Reviewed discharge instructions with wife and pt.all questions answered. All belongings packed and with pt. Pt wheelchair to the bathroom and then to his car with his wife. Pt tolerated well.

## 2019-10-25 NOTE — CARE PLAN
Problem: Safety  Goal: Will remain free from falls  Outcome: PROGRESSING AS EXPECTED     Problem: Safety  Goal: Will remain free from injury  Outcome: PROGRESSING AS EXPECTED

## 2019-10-25 NOTE — CARE PLAN
Problem: Communication  Goal: The ability to communicate needs accurately and effectively will improve  Outcome: MET     Problem: Safety  Goal: Will remain free from falls  Outcome: MET  Goal: Will remain free from injury  Outcome: MET     Problem: Knowledge Deficit  Goal: Knowledge of the prescribed therapeutic regimen will improve  Outcome: MET  Goal: Knowledge of disease process/condition, treatment plan, diagnostic tests, and medications will improve  Outcome: MET     Problem: Discharge Barriers/Planning  Goal: Patient's continuum of care needs will be met  Outcome: MET     Problem: Infection  Goal: Will remain free from infection  Outcome: MET     Problem: Safety  Goal: Free from accidental injury  Outcome: MET  Goal: Isolation Precautions for patient and staff safety  Outcome: MET     Problem: Knowledge Deficit  Goal: Patient/Significant other demonstrates understanding of disease process, treatment plan, medications and discharge instructions  Outcome: MET     Problem: Psychosocial needs  Goal: Spiritual needs incorporated in hospitalization  Outcome: MET  Goal: Anxiety reduction  Outcome: MET     Problem: Neuro Status  Goal: Monitor neuro status and rapid identification of neuro changes  Outcome: MET     Problem: Risk for Impaired Mobility  Goal: Mobilize and/or Transfer Safely with [facility] [place]  Outcome: MET  Goal: Activity Level appropriate for Discharge or Transfer  Outcome: MET     Problem: Self Care Deficit  Goal: Ability to bathe, groom and dress independently or with assistance  Outcome: MET  Goal: Ability to feed and maintain oral hygiene independently or with assistance  Outcome: MET  Goal: Ability to toilet independently or with assistance  Outcome: MET     Problem: Pain Management  Goal: Ability to identify factors that increase the pain will improve  Outcome: MET  Goal: Ability to notify healthcare provider of pain before it becomes unmanageable or unbearable will improve  Outcome:  MET  Goal: Patient's satisfaction with pain management regimen will improve  Outcome: MET  Goal: Pain level will decrease to patient's comfort goal  Outcome: MET     Problem: Fluid Volume:  Goal: Maintenance of adequate hydration will improve  Outcome: MET  Goal: Will maintain balanced intake and output  Outcome: MET     Problem: Physical Regulation:  Goal: Complications related to the disease process, condition or treatment will be avoided or minimized  Outcome: MET     Problem: Health Behavior:  Goal: Ability to state signs and symptoms to report to health care provider will improve  Outcome: MET     Problem: Venous Thromboembolism (VTW)/Deep Vein Thrombosis (DVT) Prevention:  Goal: Patient will participate in Venous Thrombosis (VTE)/Deep Vein Thrombosis (DVT)Prevention Measures  Outcome: MET     Problem: Bowel/Gastric:  Goal: Normal bowel function is maintained or improved  Outcome: MET  Goal: Will not experience complications related to bowel motility  Outcome: MET     Problem: Respiratory:  Goal: Respiratory status will improve  Outcome: MET     Problem: Mobility  Goal: Risk for activity intolerance will decrease  Outcome: MET     Problem: Acute Care of the Stroke Patient  Goal: Optimal Outcome for the Stroke patient  Outcome: MET

## 2019-10-25 NOTE — DISCHARGE INSTRUCTIONS
Discharge Instructions per Lew March M.D.    Please call your primary care provider today and set up an appointment with them. Please ask their office to arrange for post-stroke therapy evaluations such as occupational therapy and physical therapy.     DIET: low fat, low salt cardiac diet    ACTIVITY: please refrain from driving until you are seen by the occupational therapist in [place]    DIAGNOSIS: stroke    Return to ER if headache, neurological changes as discussed    On the drive home make frequent stops and stretch your legs and use Incentive Spirometer through out your drive.      Discharge Instructions    Discharged to home by car with relative. Discharged via wheelchair, hospital escort: Yes.  Special equipment needed: Not Applicable    Be sure to schedule a follow-up appointment with your primary care doctor or any specialists as instructed.     Discharge Plan:   Diet Plan: Discussed  Activity Level: Discussed  Confirmed Follow up Appointment: Patient to Call and Schedule Appointment(Call primary care MD and set up appoint.  Ensure primary care orders PT/OT.  Call Dr. Singleton on Monday)  Confirmed Symptoms Management: Discussed  Medication Reconciliation Updated: Yes  Influenza Vaccine Indication: Patient Refuses    I understand that a diet low in cholesterol, fat, and sodium is recommended for good health. Unless I have been given specific instructions below for another diet, I accept this instruction as my diet prescription.   Other diet: per Dr. March above    Special Instructions: None    · Is patient discharged on Warfarin / Coumadin?   No     Depression / Suicide Risk    As you are discharged from this [facility] facility, it is important to learn how to keep safe from harming yourself.    Recognize the warning signs:  · Abrupt changes in personality, positive or negative- including increase in energy   · Giving away possessions  · Change in eating patterns- significant weight changes-   positive or negative  · Change in sleeping patterns- unable to sleep or sleeping all the time   · Unwillingness or inability to communicate  · Depression  · Unusual sadness, discouragement and loneliness  · Talk of wanting to die  · Neglect of personal appearance   · Rebelliousness- reckless behavior  · Withdrawal from people/activities they love  · Confusion- inability to concentrate     If you or a loved one observes any of these behaviors or has concerns about self-harm, here's what you can do:  · Talk about it- your feelings and reasons for harming yourself  · Remove any means that you might use to hurt yourself (examples: pills, rope, extension cords, firearm)  · Get professional help from the community (Mental Health, Substance Abuse, psychological counseling)  · Do not be alone:Call your Safe Contact- someone whom you trust who will be there for you.  · Call your local CRISIS HOTLINE 167-3871 or 757-678-9169  · Call your local Children's Mobile Crisis Response Team [place] (646) 834-4961 or www.[place]  · Call the toll free National Suicide Prevention Hotlines   · National Suicide Prevention Lifeline 876-735-EAFS (5409)  · National Hope Line Network 800-SUICIDE (321-5740)

## 2019-10-25 NOTE — DISCHARGE SUMMARY
Discharge Summary    CHIEF COMPLAINT ON ADMISSION  Chief Complaint   Patient presents with   • Headache     Sudden onset of 10/10 posterior head pain. No trauma. Dizziness, nausea. Transfered for CT.       Reason for Admission  EMS     Admission Date  10/17/2019    CODE STATUS  Full Code    HPI & HOSPITAL COURSE  This is a 69 y.o. male here with headache   Mr. Jauregui has a past medical history of hypertension was in his usual state of health until 10/17/2019 he developed acute onset of headache and neck pain therefore presented to the emergency room.  He presented to the emergency room in [facility] where there was suspicion for subarachnoid hemorrhage therefore was transferred here for higher level of care.  He was found to have a right cerebellar ischemic infarct has been admitted to the intensive care unit.   A lumbar puncture was negative. He had markedly elevated blood pressures required multiple IV PRN medications as well as a nicardipine drip.  He was restarted back on his new home Norvasc and lisinopril was added.  He had been on a 3% saline drip which was tapered off.  Today patient is awake alert, tolerating regular diet, ambulating unassisted, and has no appreciable neurologic deficits.  His speech is clear and concise.  His blood pressure is controlled.  His wife will drive him home today and I have called his prescriptions into the [facility] in [place].   Dr. Singleton, cardiology, was consulted for a Loop recorder which will be done as an outpatient.   Therefore, he is discharged in good and stable condition to home with close outpatient follow-up.    The patient met 2-midnight criteria for an inpatient stay at the time of discharge.    Discharge Date  10/2519    FOLLOW UP ITEMS POST DISCHARGE  4.2 cm ascending aortic aneurysm which is appropriate to have serial surveillance scans as an outpatient.  This can be done to through the [facility] vascular clinic.    DISCHARGE DIAGNOSES  Principal  Problem:    CVA (cerebral vascular accident) (HCC) POA: Yes  Active Problems:    Headache POA: Yes    Neck pain POA: Yes    Dizziness POA: Yes    Essential hypertension POA: Yes    Glaucoma POA: Yes  Resolved Problems:    Subarachnoid hemorrhage (HCC) POA: Unknown      FOLLOW UP  No future appointments.  Hemanth Singleton M.D.  [address]  [address]  [facility] [address]  [phone]    Schedule an appointment as soon as possible for a visit    our schedulers will set up appointments with the stroke bridge clinic and cardiology. Mr. Jauregui will call his primary doctor for a prompt hospital follow up and possible outpatient PT/OT in [place].     MEDICATIONS ON DISCHARGE     Medication List      START taking these medications      Instructions   aspirin EC 81 MG Tbec  Commonly known as:  ECOTRIN   Take 1 Tab by mouth every day.  Dose:  81 mg     atorvastatin 40 MG Tabs  Commonly known as:  LIPITOR   Take 1 Tab by mouth every day.  Dose:  40 mg     lisinopril 20 MG Tabs  Start taking on:  10/26/2019  Commonly known as:  PRINIVIL   Take 1 Tab by mouth every day.  Dose:  20 mg        CONTINUE taking these medications      Instructions   amLODIPine 10 MG Tabs  Commonly known as:  NORVASC   Take 10 mg by mouth every day.  Dose:  10 mg     latanoprost 0.005 % Soln  Commonly known as:  XALATAN   Place 2 Drops in both eyes every evening.  Dose:  2 Drop     timolol 0.5 % Soln  Commonly known as:  TIMOPTIC   Place 1 Drop in right eye every morning.  Dose:  1 Drop            Allergies  No Known Allergies    DIET  Orders Placed This Encounter   Procedures   • Diet NPO     Standing Status:   Standing     Number of Occurrences:   8     Order Specific Question:   Restrict to:     Answer:   Sips with Medications [3]       ACTIVITY  No driving until cleared by OT in [place] post stroke    CONSULTATIONS  Critical care  Cardiology   Neurology   Dr. Day, neurosurgery  Physiatry   Infectious  disease  PROCEDURES  Central line   Lumbar puncture    LABORATORY  Lab Results   Component Value Date    SODIUM 140 10/24/2019    POTASSIUM 3.8 10/24/2019    CHLORIDE 105 10/24/2019    CO2 28 10/24/2019    GLUCOSE 103 (H) 10/24/2019    BUN 13 10/24/2019    CREATININE 0.61 10/24/2019   , HDL 41, triglycerides 63  TSH 0.39  Lab Results   Component Value Date    WBC 7.2 10/23/2019    HEMOGLOBIN 15.8 10/23/2019    HEMATOCRIT 46.3 10/23/2019    PLATELETCT 182 10/23/2019    Imaging studies:  CT-HEAD W/O   Final Result      1.  Unchanged right cerebellar infarct. No hemorrhagic transformation.   2.  Mild global parenchymal atrophy. Minimal chronic small vessel ischemic changes.   3.  No CT evidence of an infarct.      CT-HEAD W/O   Final Result         1.  Low-density changes in the right cerebellar hemisphere, appearance compatible with evolving infarct.   2.  Otherwise nonspecific white matter changes, commonly associated with small vessel ischemic disease.  Associated mild cerebral atrophy is noted.   3.  Left maxillary sinusitis changes   4.  Atherosclerosis.      EC-ECHOCARDIOGRAM COMPLETE W/O CONT   Final Result      DX-CHEST-FOR LINE PLACEMENT Perform procedure in: Patient's Room   Final Result      Right IJ central catheter is well-positioned. No pneumothorax.      MR-BRAIN-WITH & W/O   Final Result         1.  Moderately large acute area of infarction involving the right hemicerebellum inferiorly and adjacent cerebellar vermis in the distribution of the posterior inferior cerebellar artery.      2.  Age-related cerebral atrophy.      3.  Mild periventricular white matter changes consistent with chronic microvascular ischemic gliosis.      DX-CHEST-2 VIEWS   Final Result      No acute cardiopulmonary abnormality identified.      MR-CERVICAL SPINE-W/O   Final Result         1.  Moderate to severe discal and endplate degenerative changes from the C3-4 level through the C5-6 level with mild marginal  osteophytosis.      2.  Minimal to mild multilevel cervical spondylotic change from the C3-4 level through the C6-7 level most pronounced at the C4-5 level.      3.  Moderate to severe multilevel neural foraminal narrowing.      DX-LUMBAR PUNCTURE FOR DIAGNOSIS   Final Result      Fluoroscopic-guided lumbar puncture as described above.      CT-CTA HEAD WITH & W/O-POST PROCESS   Final Result         1.  CT angiogram of the [place] within normal limits.         CT-CTA NECK WITH & W/O-POST PROCESSING   Final Result         1.  4.2 cm ascending thoracic aortic aneurysm, radiographic follow-up and surveillance recommended as clinically appropriate.   2.  Otherwise unremarkable CT angiogram of the neck.      These findings were discussed with the patient's clinician, ANDREIA GIRON, on 10/17/2019 9:37 PM.      CL-IMPLANTABLE LOOP RECORDER    (Results Pending)   echocardiogram:  CONCLUSIONS  Normal left ventricular systolic function.  Left ventricular ejection fraction is visually estimated to be 65%.  Mild concentric left ventricular hypertrophy.  Mildly dilated right ventricle.  No significant valvular regurgitation or stenosis.   Normal estimated right atrial pressure.   Unable to estimate pulmonary artery pressure due to an inadequate   tricuspid regurgitant jet.      Total time of the discharge process exceeds 35 minutes.

## 2019-10-25 NOTE — PROGRESS NOTES
Pt care assumed from night RN, labs/ meds/ and orders reviewed, lines, drains and IV's verified, VSS except SpO2 88. Educated on TC and DB and IS. Encouraged mobility. Pt refused at this time.

## 2019-10-25 NOTE — PROGRESS NOTES
Pt ambulated around unit. Tolerated well. O2 sat improved to 94%. No Loop recorder placement today and so pt will discharge home today per Dr. March.

## 2019-10-30 ENCOUNTER — TELEPHONE (OUTPATIENT)
Dept: CARDIOLOGY | Facility: MEDICAL CENTER | Age: 69
End: 2019-10-30

## 2019-10-30 DIAGNOSIS — I63.341 CEREBROVASCULAR ACCIDENT (CVA) DUE TO THROMBOSIS OF RIGHT CEREBELLAR ARTERY (HCC): ICD-10-CM

## 2019-10-30 NOTE — TELEPHONE ENCOUNTER
"Heydiibeth Pace, Med Ass't  Clementina Batres R.N.             [facility]     Pt's wife Lissette called re: loop recorder procedure mentioned in CHAPARRO's 10/24 consult note. She states no one has got back to her about this procedure & if it's not scheduled by this week it's \"going to come back down on Dr. Singleton.\" She can be reached at [phone]      D/w Taryn. Pt has already been scheduled for ILR tomorrow 10/31/19. Order placed.  "

## 2019-10-31 ENCOUNTER — APPOINTMENT (OUTPATIENT)
Dept: CARDIOLOGY | Facility: MEDICAL CENTER | Age: 69
End: 2019-10-31
Attending: INTERNAL MEDICINE
Payer: MEDICARE

## 2019-10-31 ENCOUNTER — HOSPITAL ENCOUNTER (OUTPATIENT)
Facility: MEDICAL CENTER | Age: 69
End: 2019-10-31
Attending: INTERNAL MEDICINE | Admitting: INTERNAL MEDICINE
Payer: MEDICARE

## 2019-10-31 VITALS
HEIGHT: 71 IN | WEIGHT: 174.82 LBS | RESPIRATION RATE: 17 BRPM | OXYGEN SATURATION: 95 % | TEMPERATURE: 97.2 F | HEART RATE: 66 BPM | SYSTOLIC BLOOD PRESSURE: 125 MMHG | BODY MASS INDEX: 24.48 KG/M2 | DIASTOLIC BLOOD PRESSURE: 85 MMHG

## 2019-10-31 DIAGNOSIS — I63.341 CEREBROVASCULAR ACCIDENT (CVA) DUE TO THROMBOSIS OF RIGHT CEREBELLAR ARTERY (HCC): ICD-10-CM

## 2019-10-31 PROCEDURE — 33285 INSJ SUBQ CAR RHYTHM MNTR: CPT | Performed by: INTERNAL MEDICINE

## 2019-10-31 PROCEDURE — 33285 INSJ SUBQ CAR RHYTHM MNTR: CPT

## 2019-10-31 PROCEDURE — 700101 HCHG RX REV CODE 250

## 2019-10-31 PROCEDURE — 160002 HCHG RECOVERY MINUTES (STAT)

## 2019-10-31 RX ORDER — LIDOCAINE HYDROCHLORIDE AND EPINEPHRINE 10; 10 MG/ML; UG/ML
INJECTION, SOLUTION INFILTRATION; PERINEURAL
Status: COMPLETED
Start: 2019-10-31 | End: 2019-10-31

## 2019-10-31 RX ADMIN — LIDOCAINE HYDROCHLORIDE AND EPINEPHRINE: 10; 10 INJECTION, SOLUTION INFILTRATION; PERINEURAL at 16:04

## 2019-10-31 NOTE — OP REPORT
[facility] PROCEDURE NOTE    PROCEDURE PERFORMED: Implantable Loop Recorder    DATE OF SERVICE: 10/31/2019    : Sarina Reyes MD    ASSISTANT: None    ANESTHESIA: Local    EBL: <5 cc    SPECIMENS: None    INDICATION(S):  Cryptogenic stroke    DESCRIPTION OF PROCEDURE:  After informed written consent, the patient was brought to the cath lab pre/post procedure area. The patient was prepped and draped in the usual sterile fashion. The procedure was performed with local anesthetic. Using the supplied incision tool, a <1 cm incision was made in the skin about 2 cm leftward and lateral to the sternal border. Using the supplied insertion tool, a tunnel was made in the subcutaneous tissue at a 45 degree angle to the sternum and the device was inserted with the supplied plunger. Manual compression was used until hemostasis achieved. Sterile dressing was applied.    IMPLANTED DEVICE INFORMATION:  Model: Medtronic LNQ11   Serial number: [id number]     IMPRESSIONS:  1. Successful implantable loop recorder implantation    RECOMMENDATIONS:  1. Routine follow-up and device interrogation

## 2019-10-31 NOTE — DISCHARGE INSTRUCTIONS
ACTIVITY: Rest and take it easy for the first 24 hours.  A responsible adult is recommended to remain with you during that time.  It is normal to feel sleepy.  We encourage you to not do anything that requires balance, judgment or coordination.    MILD FLU-LIKE SYMPTOMS ARE NORMAL. YOU MAY EXPERIENCE GENERALIZED MUSCLE ACHES, THROAT IRRITATION, HEADACHE AND/OR SOME NAUSEA.    FOR 24 HOURS DO NOT:  Drive, operate machinery or run household appliances.  Drink beer or alcoholic beverages.   Make important decisions or sign legal documents.    DIET: To avoid nausea, slowly advance diet as tolerated, avoiding spicy or greasy foods for the first day.  Add more substantial food to your diet according to your physician's instructions.  Babies can be fed formula or breast milk as soon as they are hungry.  INCREASE FLUIDS AND FIBER TO AVOID CONSTIPATION.    SURGICAL DRESSING/BATHING: Keep dressing dry for 7 days. May remove outer dressing, LEAVE TAPE STRIPS ON FOR 1 WEEK OR UNTIL THEY FALL OFF, and may shower after 2 days on 11/2 with dry seal. Keep site dry during shower. Do not submerge in water or bath for 7 days.    FOLLOW-UP APPOINTMENT:  A follow-up appointment should be arranged with your doctor; call to schedule.    You should CALL YOUR PHYSICIAN if you develop:  Fever greater than 101 degrees F.  Pain not relieved by medication, or persistent nausea or vomiting.  Excessive bleeding (blood soaking through dressing) or unexpected drainage from the wound.  Extreme redness or swelling around the incision site, drainage of pus or foul smelling drainage.  Inability to urinate or empty your bladder within 8 hours.  Problems with breathing or chest pain.    You should call 911 if you develop problems with breathing or chest pain.  If you are unable to contact your doctor or surgical center, you should go to the nearest emergency room or urgent care center.  Physician's telephone #: cardiology [phone]    If any questions  "arise, call your doctor.  If your doctor is not available, please feel free to call the Surgical Center at [phone].  The Center is open Monday through Friday from 7AM to 7PM.  You can also call the HEALTH HOTLINE open 24 hours/day, 7 days/week and speak to a nurse at [phone], or toll free at [phone].    A registered nurse may call you a few days after your surgery to see how you are doing after your procedure.    MEDICATIONS: Resume taking daily medication.  Take prescribed pain medication with food.  If no medication is prescribed, you may take non-aspirin pain medication if needed.  PAIN MEDICATION CAN BE VERY CONSTIPATING.  Take a stool softener or laxative such as senokot, pericolace, or milk of magnesia if needed.    If your physician has prescribed pain medication that includes Acetaminophen (Tylenol), do not take additional Acetaminophen (Tylenol) while taking the prescribed medication.      Implantable Loop Recorder Placement  Introduction  An implantable loop recorder is a small electronic device that is placed under the skin of your chest. It is about the size of an AA (\"double A\") battery. The device records the electrical activity of your heart over a long period of time. Your health care provider can download these recordings to monitor your heart.  You may need an implantable loop recorder if you have periods of abnormal heart activity (arrhythmias) or unexplained fainting (syncope) caused by a heart problem.  Tell a health care provider about:  · Any allergies you have.  · All medicines you are taking, including vitamins, herbs, eye drops, creams, and over-the-counter medicines.  · Any problems you or family members have had with anesthetic medicines.  · Any blood disorders you have.  · Any surgeries you have had.  · Any medical conditions you have.  · Whether you are pregnant or may be pregnant.  What are the risks?  Generally, this is a safe procedure. However, as with any " procedure, problems may occur, including:  · Infection.  · Bleeding.  · Allergic reactions to anesthetic medicines.  · Damage to nerves or blood vessels.  · Failure of the device to work. This could require another surgery to replace it.  What happens before the procedure?    · You may have a physical exam, blood tests, and imaging tests of your heart, such as a chest X-ray.  · Follow instructions from your health care provider about eating or drinking restrictions.  · Ask your health care provider about:  ¨ Changing or stopping your regular medicines. This is especially important if you are taking diabetes medicines or blood thinners.  ¨ Taking medicines such as aspirin and ibuprofen. These medicines can thin your blood. Do not take these medicines before your procedure if your surgeon instructs you not to.  · Ask your health care provider how your surgical site will be marked or identified.  · You may be given antibiotic medicine to help prevent infection.  · Plan to have someone take you home after the procedure.  · If you will be going home right after the procedure, plan to have someone with you for 24 hours.  · Do not use any tobacco products, such as cigarettes, chewing tobacco, and e-cigarettes as told by your surgeon. If you need help quitting, ask your health care provider.  What happens during the procedure?  · To reduce your risk of infection:  ¨ Your health care team will wash or sanitize their hands.  ¨ Your skin will be washed with soap.  · An IV tube will be inserted into one of your veins.  · You may be given an antibiotic medicine through the IV tube.  · You may be given one or more of the following:  ¨ A medicine to help you relax (sedative).  ¨ A medicine to numb the area (local anesthetic).  · A small cut (incision) will be made on the left side of your upper chest.  · A pocket will be created under your skin.  · The device will be placed in the pocket.  · The incision will be closed with  stitches (sutures) or adhesive strips.  · A bandage (dressing) will be placed over the incision.  The procedure may vary among health care providers and hospitals.  What happens after the procedure?  · Your blood pressure, heart rate, breathing rate, and blood oxygen level will be monitored often until the medicines you were given have worn off.  · You may be able to go home on the day of your surgery. Before going home:  ¨ Your health care provider will program your recorder.  ¨ You will learn how to trigger your device with a handheld activator.  ¨ You will learn how to send recordings to your health care provider.  ¨ You will get an ID card for your device, and you will be told when to use it.  · Do not drive for 24 hours if you received a sedative.  This information is not intended to replace advice given to you by your health care provider. Make sure you discuss any questions you have with your health care provider.  Document Released: 11/28/2016 Document Revised: 05/25/2017 Document Reviewed: 09/21/2016  © 2017 Dev    Depression / Suicide Risk    As you are discharged from this [facility] facility, it is important to learn how to keep safe from harming yourself.    Recognize the warning signs:  · Abrupt changes in personality, positive or negative- including increase in energy   · Giving away possessions  · Change in eating patterns- significant weight changes-  positive or negative  · Change in sleeping patterns- unable to sleep or sleeping all the time   · Unwillingness or inability to communicate  · Depression  · Unusual sadness, discouragement and loneliness  · Talk of wanting to die  · Neglect of personal appearance   · Rebelliousness- reckless behavior  · Withdrawal from people/activities they love  · Confusion- inability to concentrate     If you or a loved one observes any of these behaviors or has concerns about self-harm, here's what you can do:  · Talk about it- your feelings and reasons for  harming yourself  · Remove any means that you might use to hurt yourself (examples: pills, rope, extension cords, firearm)  · Get professional help from the community (Mental Health, Substance Abuse, psychological counseling)  · Do not be alone:Call your Safe Contact- someone whom you trust who will be there for you.  · Call your local CRISIS HOTLINE 922-6285 or 118-907-0355  · Call your local Children's Mobile Crisis Response Team [place] (519) 205-8985 or www.[employer]  · Call the toll free National Suicide Prevention Hotlines   · National Suicide Prevention Lifeline 444-261-MWVF (7461)  · National Hope Line Network 800-SUICIDE (906-2639)

## 2019-10-31 NOTE — OR NURSING
1600   PT RECEIVED FROM CATH LAB,  S/P LOOP INSERTION VIA MID-CHEST.  MID CHEST INCISION WITH DRESSING INTACT.  DENIES ANY PAIN.  NO SEDATION GIVEN.      1615   DC INSTRUCTIONS GIVEN TO PT AND WIFE.  VERBALIZED UNDERSTANDING OF ALL.  HL DC.  PT DC TO HOME,  VIA WHEELCHAIR,  ESCORTED OUT BY CNA.

## 2019-11-07 LAB
SIN NOMBRE VIRUS 93385: NORMAL
SIN NOMBRE VIRUS IGM 93386: NORMAL

## 2019-11-11 ENCOUNTER — TELEPHONE (OUTPATIENT)
Dept: CARDIOLOGY | Facility: MEDICAL CENTER | Age: 69
End: 2019-11-11

## 2019-11-11 NOTE — TELEPHONE ENCOUNTER
Patient transmitted via Loop recorder today of ST vs AF episode 11/9/19 ~ 33 secs @ 12:53pm. Rhythm appears regular. ILR for Cryptogenic stroke, no OAC. Please review to confirm. To be scanned into media for review.

## 2019-11-11 NOTE — TELEPHONE ENCOUNTER
Message   Received: Today   Message Contents   Sarthaking JERRICA Reyes L.P.N.   Caller: Unspecified (Today, 10:19 AM)             Looks not like AF thanks

## 2019-12-24 ENCOUNTER — NON-PROVIDER VISIT (OUTPATIENT)
Dept: CARDIOLOGY | Facility: MEDICAL CENTER | Age: 69
End: 2019-12-24
Payer: MEDICARE

## 2019-12-24 DIAGNOSIS — Z95.818 STATUS POST PLACEMENT OF IMPLANTABLE LOOP RECORDER: ICD-10-CM

## 2019-12-24 DIAGNOSIS — I63.9 CRYPTOGENIC STROKE (HCC): ICD-10-CM

## 2019-12-24 PROCEDURE — 93298 REM INTERROG DEV EVAL SCRMS: CPT | Performed by: INTERNAL MEDICINE

## 2020-01-30 ENCOUNTER — NON-PROVIDER VISIT (OUTPATIENT)
Dept: CARDIOLOGY | Facility: MEDICAL CENTER | Age: 70
End: 2020-01-30
Payer: MEDICARE

## 2020-01-30 DIAGNOSIS — I63.9 CRYPTOGENIC STROKE (HCC): ICD-10-CM

## 2020-01-30 DIAGNOSIS — Z95.818 STATUS POST PLACEMENT OF IMPLANTABLE LOOP RECORDER: ICD-10-CM

## 2020-01-30 PROCEDURE — 93298 REM INTERROG DEV EVAL SCRMS: CPT | Performed by: INTERNAL MEDICINE

## 2020-03-03 ENCOUNTER — NON-PROVIDER VISIT (OUTPATIENT)
Dept: CARDIOLOGY | Facility: MEDICAL CENTER | Age: 70
End: 2020-03-03
Payer: MEDICARE

## 2020-03-03 DIAGNOSIS — Z95.818 STATUS POST PLACEMENT OF IMPLANTABLE LOOP RECORDER: ICD-10-CM

## 2020-03-03 DIAGNOSIS — I63.9 CRYPTOGENIC STROKE (HCC): ICD-10-CM

## 2020-03-03 PROCEDURE — 93298 REM INTERROG DEV EVAL SCRMS: CPT | Performed by: INTERNAL MEDICINE

## 2020-03-26 ENCOUNTER — NON-PROVIDER VISIT (OUTPATIENT)
Dept: CARDIOLOGY | Facility: MEDICAL CENTER | Age: 70
End: 2020-03-26
Payer: MEDICARE

## 2020-03-30 ENCOUNTER — NON-PROVIDER VISIT (OUTPATIENT)
Dept: CARDIOLOGY | Facility: MEDICAL CENTER | Age: 70
End: 2020-03-30
Payer: MEDICARE

## 2020-04-28 ENCOUNTER — NON-PROVIDER VISIT (OUTPATIENT)
Dept: CARDIOLOGY | Facility: MEDICAL CENTER | Age: 70
End: 2020-04-28
Payer: MEDICARE

## 2020-04-28 DIAGNOSIS — I63.9 CRYPTOGENIC STROKE (HCC): ICD-10-CM

## 2020-04-28 DIAGNOSIS — Z95.818 STATUS POST PLACEMENT OF IMPLANTABLE LOOP RECORDER: ICD-10-CM

## 2020-04-28 PROCEDURE — 93298 REM INTERROG DEV EVAL SCRMS: CPT | Performed by: INTERNAL MEDICINE

## 2021-03-08 ENCOUNTER — TELEPHONE (OUTPATIENT)
Dept: CARDIOLOGY | Facility: MEDICAL CENTER | Age: 71
End: 2021-03-08

## 2021-03-08 NOTE — TELEPHONE ENCOUNTER
Pt needs to be scheduled for remote monitoring again once patient reconnects device.     Letter sent to patient